# Patient Record
Sex: FEMALE | Race: WHITE | Employment: PART TIME | URBAN - METROPOLITAN AREA
[De-identification: names, ages, dates, MRNs, and addresses within clinical notes are randomized per-mention and may not be internally consistent; named-entity substitution may affect disease eponyms.]

---

## 2017-01-23 ENCOUNTER — OFFICE VISIT (OUTPATIENT)
Dept: ORTHOPEDIC SURGERY | Age: 52
End: 2017-01-23

## 2017-01-23 VITALS
SYSTOLIC BLOOD PRESSURE: 114 MMHG | WEIGHT: 175 LBS | HEART RATE: 60 BPM | HEIGHT: 69 IN | DIASTOLIC BLOOD PRESSURE: 80 MMHG | BODY MASS INDEX: 25.92 KG/M2

## 2017-01-23 DIAGNOSIS — M25.561 CHRONIC PAIN OF RIGHT KNEE: ICD-10-CM

## 2017-01-23 DIAGNOSIS — M17.11 PRIMARY OSTEOARTHRITIS OF RIGHT KNEE: Primary | ICD-10-CM

## 2017-01-23 DIAGNOSIS — M21.161 ACQUIRED VARUS DEFORMITY KNEE, RIGHT: ICD-10-CM

## 2017-01-23 DIAGNOSIS — G89.29 CHRONIC PAIN OF RIGHT KNEE: ICD-10-CM

## 2017-01-23 DIAGNOSIS — Z87.828 HX OF TEAR OF ACL (ANTERIOR CRUCIATE LIGAMENT): ICD-10-CM

## 2017-01-23 PROCEDURE — 99203 OFFICE O/P NEW LOW 30 MIN: CPT | Performed by: ORTHOPAEDIC SURGERY

## 2017-01-23 PROCEDURE — 73564 X-RAY EXAM KNEE 4 OR MORE: CPT | Performed by: ORTHOPAEDIC SURGERY

## 2017-01-23 RX ORDER — FLUTICASONE PROPIONATE 50 MCG
1 SPRAY, SUSPENSION (ML) NASAL DAILY
COMMUNITY

## 2017-01-23 RX ORDER — DICLOFENAC SODIUM 75 MG/1
75 TABLET, DELAYED RELEASE ORAL 2 TIMES DAILY
Qty: 60 TABLET | Refills: 3 | Status: SHIPPED | OUTPATIENT
Start: 2017-01-23 | End: 2019-04-03 | Stop reason: CLARIF

## 2017-01-30 ENCOUNTER — HOSPITAL ENCOUNTER (OUTPATIENT)
Dept: PHYSICAL THERAPY | Age: 52
Discharge: HOME | End: 2017-01-31
Admitting: ORTHOPAEDIC SURGERY

## 2017-02-20 ENCOUNTER — OFFICE VISIT (OUTPATIENT)
Dept: ORTHOPEDIC SURGERY | Age: 52
End: 2017-02-20

## 2017-02-20 VITALS
DIASTOLIC BLOOD PRESSURE: 79 MMHG | HEIGHT: 69 IN | BODY MASS INDEX: 25.92 KG/M2 | WEIGHT: 175 LBS | HEART RATE: 67 BPM | SYSTOLIC BLOOD PRESSURE: 116 MMHG

## 2017-02-20 DIAGNOSIS — Z87.828 HX OF TEAR OF ACL (ANTERIOR CRUCIATE LIGAMENT): ICD-10-CM

## 2017-02-20 DIAGNOSIS — M21.161 ACQUIRED VARUS DEFORMITY KNEE, RIGHT: ICD-10-CM

## 2017-02-20 DIAGNOSIS — M23.303 MENISCUS, MEDIAL, DERANGEMENT, RIGHT: ICD-10-CM

## 2017-02-20 DIAGNOSIS — M17.11 PRIMARY OSTEOARTHRITIS OF RIGHT KNEE: Primary | ICD-10-CM

## 2017-02-20 PROCEDURE — 99213 OFFICE O/P EST LOW 20 MIN: CPT | Performed by: ORTHOPAEDIC SURGERY

## 2017-02-20 PROCEDURE — 20610 DRAIN/INJ JOINT/BURSA W/O US: CPT | Performed by: ORTHOPAEDIC SURGERY

## 2017-03-24 ENCOUNTER — TELEPHONE (OUTPATIENT)
Dept: ORTHOPEDIC SURGERY | Age: 52
End: 2017-03-24

## 2017-04-04 ENCOUNTER — HOSPITAL ENCOUNTER (OUTPATIENT)
Dept: PREADMISSION TESTING | Age: 52
Discharge: HOME | End: 2017-04-04
Attending: ORTHOPAEDIC SURGERY | Admitting: ORTHOPAEDIC SURGERY

## 2017-04-04 VITALS
OXYGEN SATURATION: 99 % | DIASTOLIC BLOOD PRESSURE: 73 MMHG | HEIGHT: 69 IN | WEIGHT: 181 LBS | RESPIRATION RATE: 16 BRPM | BODY MASS INDEX: 26.81 KG/M2 | TEMPERATURE: 98 F | SYSTOLIC BLOOD PRESSURE: 125 MMHG | HEART RATE: 60 BPM

## 2017-04-04 ASSESSMENT — PAIN DESCRIPTION - DESCRIPTORS: DESCRIPTORS: ACHING

## 2017-04-04 ASSESSMENT — PAIN DESCRIPTION - PAIN TYPE: TYPE: CHRONIC PAIN

## 2017-04-04 ASSESSMENT — PAIN DESCRIPTION - ORIENTATION: ORIENTATION: RIGHT

## 2017-04-04 ASSESSMENT — PAIN SCALES - GENERAL: PAINLEVEL_OUTOF10: 3

## 2017-04-04 ASSESSMENT — PAIN DESCRIPTION - LOCATION: LOCATION: KNEE

## 2017-04-17 ENCOUNTER — OFFICE VISIT (OUTPATIENT)
Dept: ORTHOPEDIC SURGERY | Age: 52
End: 2017-04-17

## 2017-04-17 VITALS
HEART RATE: 62 BPM | BODY MASS INDEX: 26.81 KG/M2 | HEIGHT: 69 IN | DIASTOLIC BLOOD PRESSURE: 76 MMHG | SYSTOLIC BLOOD PRESSURE: 114 MMHG | WEIGHT: 181 LBS

## 2017-04-17 DIAGNOSIS — M17.11 PRIMARY OSTEOARTHRITIS OF RIGHT KNEE: ICD-10-CM

## 2017-04-17 DIAGNOSIS — Z96.652 STATUS POST TOTAL LEFT KNEE REPLACEMENT: Primary | ICD-10-CM

## 2017-04-17 DIAGNOSIS — M17.12 PRIMARY OSTEOARTHRITIS OF LEFT KNEE: Chronic | ICD-10-CM

## 2017-04-17 PROCEDURE — 99213 OFFICE O/P EST LOW 20 MIN: CPT | Performed by: ORTHOPAEDIC SURGERY

## 2017-04-17 RX ORDER — PANTOPRAZOLE SODIUM 40 MG/1
40 TABLET, DELAYED RELEASE ORAL DAILY
Qty: 30 TABLET | Refills: 3 | Status: SHIPPED | OUTPATIENT
Start: 2017-04-17 | End: 2019-04-03 | Stop reason: CLARIF

## 2017-05-01 ENCOUNTER — OFFICE VISIT (OUTPATIENT)
Dept: ORTHOPEDIC SURGERY | Age: 52
End: 2017-05-01

## 2017-05-01 VITALS
DIASTOLIC BLOOD PRESSURE: 70 MMHG | SYSTOLIC BLOOD PRESSURE: 114 MMHG | HEART RATE: 81 BPM | WEIGHT: 181 LBS | BODY MASS INDEX: 26.81 KG/M2 | HEIGHT: 69 IN

## 2017-05-01 DIAGNOSIS — Z96.651 STATUS POST TOTAL RIGHT KNEE REPLACEMENT: Primary | ICD-10-CM

## 2017-05-01 DIAGNOSIS — Z96.652 STATUS POST TOTAL LEFT KNEE REPLACEMENT: ICD-10-CM

## 2017-05-01 DIAGNOSIS — Z96.659 POSTOPERATIVE STIFFNESS OF TOTAL KNEE REPLACEMENT, SUBSEQUENT ENCOUNTER: ICD-10-CM

## 2017-05-01 DIAGNOSIS — T84.89XD POSTOPERATIVE STIFFNESS OF TOTAL KNEE REPLACEMENT, SUBSEQUENT ENCOUNTER: ICD-10-CM

## 2017-05-01 DIAGNOSIS — M25.669 POSTOPERATIVE STIFFNESS OF TOTAL KNEE REPLACEMENT, SUBSEQUENT ENCOUNTER: ICD-10-CM

## 2017-05-01 PROCEDURE — 99024 POSTOP FOLLOW-UP VISIT: CPT | Performed by: ORTHOPAEDIC SURGERY

## 2017-05-01 RX ORDER — HYDROCODONE BITARTRATE AND ACETAMINOPHEN 5; 325 MG/1; MG/1
1 TABLET ORAL 3 TIMES DAILY PRN
Qty: 60 TABLET | Refills: 0 | Status: SHIPPED | OUTPATIENT
Start: 2017-05-01 | End: 2017-05-22

## 2017-05-01 RX ORDER — OXYCODONE AND ACETAMINOPHEN 7.5; 325 MG/1; MG/1
2 TABLET ORAL EVERY 4 HOURS PRN
Qty: 40 TABLET | Refills: 0 | Status: SHIPPED | OUTPATIENT
Start: 2017-05-01 | End: 2017-05-22

## 2017-05-01 RX ORDER — ONDANSETRON 4 MG/1
4 TABLET, FILM COATED ORAL 4 TIMES DAILY PRN
Qty: 39 TABLET | Refills: 0 | Status: SHIPPED | OUTPATIENT
Start: 2017-05-01 | End: 2019-04-03 | Stop reason: CLARIF

## 2017-05-04 ENCOUNTER — HOSPITAL ENCOUNTER (OUTPATIENT)
Dept: PHYSICAL THERAPY | Age: 52
Discharge: HOME | End: 2017-05-31
Admitting: ORTHOPAEDIC SURGERY

## 2017-05-11 ENCOUNTER — HOSPITAL ENCOUNTER (OUTPATIENT)
Dept: PHYSICAL THERAPY | Age: 52
Discharge: HOME | End: 2017-05-11
Admitting: ORTHOPAEDIC SURGERY

## 2017-05-12 ENCOUNTER — HOSPITAL ENCOUNTER (OUTPATIENT)
Dept: PHYSICAL THERAPY | Age: 52
Discharge: HOME | End: 2017-05-12
Admitting: ORTHOPAEDIC SURGERY

## 2017-05-16 ENCOUNTER — HOSPITAL ENCOUNTER (OUTPATIENT)
Dept: PHYSICAL THERAPY | Age: 52
Discharge: HOME | End: 2017-05-16
Admitting: ORTHOPAEDIC SURGERY

## 2017-05-19 ENCOUNTER — HOSPITAL ENCOUNTER (OUTPATIENT)
Dept: PHYSICAL THERAPY | Age: 52
Discharge: HOME | End: 2017-05-19
Admitting: ORTHOPAEDIC SURGERY

## 2017-05-22 ENCOUNTER — OFFICE VISIT (OUTPATIENT)
Dept: ORTHOPEDIC SURGERY | Age: 52
End: 2017-05-22

## 2017-05-22 VITALS
SYSTOLIC BLOOD PRESSURE: 102 MMHG | HEIGHT: 69 IN | HEART RATE: 40 BPM | WEIGHT: 181 LBS | DIASTOLIC BLOOD PRESSURE: 66 MMHG | BODY MASS INDEX: 26.81 KG/M2

## 2017-05-22 DIAGNOSIS — Z96.659 POSTOPERATIVE STIFFNESS OF TOTAL KNEE REPLACEMENT, INITIAL ENCOUNTER (HCC): ICD-10-CM

## 2017-05-22 DIAGNOSIS — Z96.651 STATUS POST TOTAL RIGHT KNEE REPLACEMENT: ICD-10-CM

## 2017-05-22 DIAGNOSIS — M25.669 POSTOPERATIVE STIFFNESS OF TOTAL KNEE REPLACEMENT, INITIAL ENCOUNTER (HCC): ICD-10-CM

## 2017-05-22 DIAGNOSIS — Z96.652 STATUS POST TOTAL LEFT KNEE REPLACEMENT: Primary | ICD-10-CM

## 2017-05-22 DIAGNOSIS — T84.89XA POSTOPERATIVE STIFFNESS OF TOTAL KNEE REPLACEMENT, INITIAL ENCOUNTER (HCC): ICD-10-CM

## 2017-05-22 PROCEDURE — 99024 POSTOP FOLLOW-UP VISIT: CPT | Performed by: ORTHOPAEDIC SURGERY

## 2017-05-22 RX ORDER — PREDNISONE 20 MG/1
20 TABLET ORAL 3 TIMES DAILY
Qty: 35 TABLET | Refills: 0 | Status: SHIPPED | OUTPATIENT
Start: 2017-05-22 | End: 2017-06-23 | Stop reason: ALTCHOICE

## 2017-05-23 ENCOUNTER — HOSPITAL ENCOUNTER (OUTPATIENT)
Dept: PHYSICAL THERAPY | Age: 52
Discharge: HOME | End: 2017-05-23
Admitting: ORTHOPAEDIC SURGERY

## 2017-05-25 ENCOUNTER — HOSPITAL ENCOUNTER (OUTPATIENT)
Dept: PHYSICAL THERAPY | Age: 52
Discharge: HOME | End: 2017-05-25
Admitting: ORTHOPAEDIC SURGERY

## 2017-05-30 ENCOUNTER — HOSPITAL ENCOUNTER (OUTPATIENT)
Dept: PHYSICAL THERAPY | Age: 52
Discharge: HOME | End: 2017-05-30
Admitting: ORTHOPAEDIC SURGERY

## 2017-06-01 ENCOUNTER — HOSPITAL ENCOUNTER (OUTPATIENT)
Dept: PHYSICAL THERAPY | Age: 52
Discharge: HOME | End: 2017-06-01
Admitting: ORTHOPAEDIC SURGERY

## 2017-06-06 ENCOUNTER — HOSPITAL ENCOUNTER (OUTPATIENT)
Dept: PHYSICAL THERAPY | Age: 52
Discharge: HOME | End: 2017-06-06
Admitting: ORTHOPAEDIC SURGERY

## 2017-06-08 ENCOUNTER — HOSPITAL ENCOUNTER (OUTPATIENT)
Dept: PHYSICAL THERAPY | Age: 52
Discharge: HOME | End: 2017-06-08
Admitting: ORTHOPAEDIC SURGERY

## 2017-06-13 ENCOUNTER — HOSPITAL ENCOUNTER (OUTPATIENT)
Dept: PHYSICAL THERAPY | Age: 52
Discharge: HOME | End: 2017-06-13
Admitting: ORTHOPAEDIC SURGERY

## 2017-06-15 ENCOUNTER — HOSPITAL ENCOUNTER (OUTPATIENT)
Dept: PHYSICAL THERAPY | Age: 52
Discharge: HOME | End: 2017-06-15
Admitting: ORTHOPAEDIC SURGERY

## 2017-06-20 ENCOUNTER — HOSPITAL ENCOUNTER (OUTPATIENT)
Dept: PHYSICAL THERAPY | Age: 52
Discharge: HOME | End: 2017-06-20
Admitting: ORTHOPAEDIC SURGERY

## 2017-06-22 ENCOUNTER — HOSPITAL ENCOUNTER (OUTPATIENT)
Dept: PHYSICAL THERAPY | Age: 52
Discharge: HOME | End: 2017-06-22
Admitting: ORTHOPAEDIC SURGERY

## 2017-06-23 ENCOUNTER — OFFICE VISIT (OUTPATIENT)
Dept: ORTHOPEDIC SURGERY | Age: 52
End: 2017-06-23

## 2017-06-23 VITALS
BODY MASS INDEX: 26.81 KG/M2 | HEIGHT: 69 IN | HEART RATE: 73 BPM | SYSTOLIC BLOOD PRESSURE: 112 MMHG | DIASTOLIC BLOOD PRESSURE: 78 MMHG | WEIGHT: 181 LBS

## 2017-06-23 DIAGNOSIS — Z96.652 STATUS POST TOTAL LEFT KNEE REPLACEMENT: ICD-10-CM

## 2017-06-23 DIAGNOSIS — Z96.651 STATUS POST TOTAL RIGHT KNEE REPLACEMENT: Primary | ICD-10-CM

## 2017-06-23 PROCEDURE — 99024 POSTOP FOLLOW-UP VISIT: CPT | Performed by: ORTHOPAEDIC SURGERY

## 2017-06-23 PROCEDURE — 73562 X-RAY EXAM OF KNEE 3: CPT | Performed by: ORTHOPAEDIC SURGERY

## 2017-06-26 ENCOUNTER — HOSPITAL ENCOUNTER (OUTPATIENT)
Dept: PHYSICAL THERAPY | Age: 52
Discharge: HOME | End: 2017-06-26
Admitting: ORTHOPAEDIC SURGERY

## 2017-07-17 ENCOUNTER — HOSPITAL ENCOUNTER (OUTPATIENT)
Dept: PHYSICAL THERAPY | Age: 52
Discharge: HOME | End: 2017-07-17
Admitting: ORTHOPAEDIC SURGERY

## 2017-07-20 ENCOUNTER — HOSPITAL ENCOUNTER (OUTPATIENT)
Dept: PHYSICAL THERAPY | Age: 52
Discharge: HOME | End: 2017-07-20
Admitting: ORTHOPAEDIC SURGERY

## 2017-09-06 ENCOUNTER — OFFICE VISIT (OUTPATIENT)
Dept: ORTHOPEDIC SURGERY | Age: 52
End: 2017-09-06

## 2017-09-06 VITALS
HEIGHT: 69 IN | SYSTOLIC BLOOD PRESSURE: 134 MMHG | BODY MASS INDEX: 26.81 KG/M2 | WEIGHT: 181 LBS | DIASTOLIC BLOOD PRESSURE: 89 MMHG | HEART RATE: 72 BPM

## 2017-09-06 DIAGNOSIS — S43.004A SHOULDER DISLOCATION, RIGHT, INITIAL ENCOUNTER: Primary | ICD-10-CM

## 2017-09-06 DIAGNOSIS — M25.512 LEFT SHOULDER PAIN, UNSPECIFIED CHRONICITY: ICD-10-CM

## 2017-09-06 PROCEDURE — 73030 X-RAY EXAM OF SHOULDER: CPT | Performed by: ORTHOPAEDIC SURGERY

## 2017-09-06 PROCEDURE — 99214 OFFICE O/P EST MOD 30 MIN: CPT | Performed by: ORTHOPAEDIC SURGERY

## 2017-09-20 ENCOUNTER — OFFICE VISIT (OUTPATIENT)
Dept: ORTHOPEDIC SURGERY | Age: 52
End: 2017-09-20

## 2017-09-20 VITALS
HEART RATE: 74 BPM | SYSTOLIC BLOOD PRESSURE: 122 MMHG | HEIGHT: 69 IN | BODY MASS INDEX: 26.81 KG/M2 | DIASTOLIC BLOOD PRESSURE: 76 MMHG | WEIGHT: 181 LBS

## 2017-09-20 DIAGNOSIS — S43.005A SHOULDER DISLOCATION, LEFT, INITIAL ENCOUNTER: Primary | ICD-10-CM

## 2017-09-20 PROCEDURE — 99213 OFFICE O/P EST LOW 20 MIN: CPT | Performed by: ORTHOPAEDIC SURGERY

## 2017-10-04 ENCOUNTER — HOSPITAL ENCOUNTER (OUTPATIENT)
Dept: PHYSICAL THERAPY | Age: 52
Discharge: HOME | End: 2017-10-04
Admitting: ORTHOPAEDIC SURGERY

## 2017-10-04 NOTE — FLOWSHEET NOTE
[facility]  Orthopaedics and Sports Rehabilitation, Adel[facility]wick       Physical Therapy Daily Treatment Note  Date:  10/4/2017    Patient Name:  Brayan Ritchie    :  1965  MRN: [id number]  Restrictions/Precautions:    Medical/Treatment Diagnosis Information:  Diagnosis: S43.005A (ICD-10-CM) - Shoulder dislocation, left, initial encounter  Treatment Diagnosis: Decreased functional mobility ; Decreased ADL status; Decreased ROM; Decreased strength; d/t shoulder dislocation with pain into overhead motion  Insurance/Certification information:  PT Insurance Information: PT BENEFITS  FACILITY/ CUSTOM DESIGN BENEFTIS/ EFFECTIVE 13/ ACTIVE/ DED 0/ OOP 6600/ COPAY 25/ PAYS 100%/ 60 VPCY COMB/ USED 18 VISITS/0 VAISHNAVI/ JOHN/ REF# [id number]/ 10-3-17 PAG  Physician Information:  Referring Practitioner: Cecilia Holbrook of care signed (Y/N): Y    Date of Patient follow up with Physician: 6 weeks    G-Code (if applicable):      Date G-Code Applied:  10/4/17  PT G-Codes  Functional Assessment Tool Used: UEFI  Score: 77/80 or 4% deficit  Functional Limitation: Carrying, moving and handling objects  Carrying, Moving and Handling Objects Current Status (): At least 1 percent but less than 20 percent impaired, limited or restricted  Carrying, Moving and Handling Objects Goal Status (): At least 1 percent but less than 20 percent impaired, limited or restricted    Progress Note: [x]  Yes  []  No  Next due by: Visit #10 or 17      Latex Allergy:  [x]NO      []YES  Preferred Language for Healthcare:   [x]English       []other:19    Visit # Insurance Allowable   19 60     Pain level:  0/10     SUBJECTIVE:  See eval    OBJECTIVE:     UEFI 77/80 or 4% deficit    ROM PROM AROM  Comment     L R L R     Flexion     115! 165 anxiety   Abduction     NA!  165 anxiety   ER     32! 83 at side d/t anxiety   IR     to belly 40 anxiety   Other(cervical)             Other                    Strength L R Comment   Flexion 4+ 4+     Abduction 4+ 4+     ER 4+ 4+     IR 4+ 4+        Special Tests Results/Comment   Load/Shift  Positive   Sulcus Sign Positive   Apprehension Sign Positive      Reflexes/Sensation:                         [x]Dermatomes/Myotomes intact                         [x]Reflexes equal and normal bilaterally                        []Other:     Joint mobility: 4/9 Beighton's Index                        []Normal                                   []Hypo                        [x]Hyper     Palpation: no ttp                        Functional Mobility/Transfers: independence     Posture: normal    RESTRICTIONS/PRECAUTIONS: avoid compromising positions    Exercises/Interventions:   Exercises:  Exercise/Equipment Resistance/Repetitions Other comments   Stretching/PROM     Wand     Table Slides 20x    UE [place]     Pulleys     Pendulum          Isometrics     Retraction 10x10\"         Weight shift     Flexion     Abduction     External Rotation Green, 10x10\"    Internal Rotation     Biceps     Triceps          PRE's     Flexion     Abduction     External Rotation     Internal Rotation     Shrugs     EXT     Reverse Flys     Serratus     Horizontal Abd with ER     Biceps     Triceps     Retraction          Cable Column/Theraband     External Rotation     Internal Rotation     Shrugs     Lats     Ext     Flex     Scapular Retraction     BIC     TRIC     PNF          Dynamic Stability     BoW 2x30 towel   Pushup + x30 wall        Plyoback          Manual interventions                     Therapeutic Exercise and NMR EXR  [x] (75522) Provided verbal/tactile cueing for activities related to strengthening, flexibility, endurance, ROM  for improvements in scapular, scapulothoracic and UE control with self care, reaching, carrying, lifting, house/yardwork, driving/computer work.    [] (69328) Provided verbal/tactile cueing for activities related to improving balance, coordination, kinesthetic sense, posture, motor skill, proprioception  to assist with  scapular, scapulothoracic and UE control with self care, reaching, carrying, lifting, house/yardwork, driving/computer work. Therapeutic Activities:    [] (88578 or 42125) Provided verbal/tactile cueing for activities related to improving balance, coordination, kinesthetic sense, posture, motor skill, proprioception and motor activation to allow for proper function of scapular, scapulothoracic and UE control with self care, carrying, lifting, driving/computer work.      Home Exercise Program:    [x] (79785) Reviewed/Progressed HEP activities related to strengthening, flexibility, endurance, ROM of scapular, scapulothoracic and UE control with self care, reaching, carrying, lifting, house/yardwork, driving/computer work  [] (15673) Reviewed/Progressed HEP activities related to improving balance, coordination, kinesthetic sense, posture, motor skill, proprioception of scapular, scapulothoracic and UE control with self care, reaching, carrying, lifting, house/yardwork, driving/computer work      Manual Treatments:  PROM / STM / Oscillations-Mobs:  G-I, II, III, IV (PA's, Inf., Post.)  [] (99909) Provided manual therapy to mobilize soft tissue/joints of cervical/CT, scapular GHJ and UE for the purpose of modulating pain, promoting relaxation,  increasing ROM, reducing/eliminating soft tissue swelling/inflammation/restriction, improving soft tissue extensibility and allowing for proper ROM for normal function with self care, reaching, carrying, lifting, house/yardwork, driving/computer work    Modalities: declined     Charges:  Timed Code Treatment Minutes: 30   Total Treatment Minutes: 50       [x] EVAL (LOW) 54218 (typically 20 minutes face-to-face)  [] EVAL (MOD) 47354 (typically 30 minutes face-to-face)  [] EVAL (HIGH) 00726 (typically 45 minutes face-to-face)  [] RE-EVAL      [x] PD(65721) x  1   [] IONTO  [x] NMR (02274) x  1   [] VASO  [] Manual (55303) x       [] Other:  [] TA x Discharge    Electronically signed by: Janette Mckay PT, DPT      Go Williamson. Grover Johnson, [place]RA[place], [id number]  Physical Therapist  [email or web address]

## 2017-10-04 NOTE — PLAN OF CARE
continue independently. Relevant Medical History: Recent therapy with discharge for dislocation  Functional Disability Index:PT G-Codes  Functional Assessment Tool Used: UEFI  Score: 77/80 or 4% deficit  Functional Limitation: Carrying, moving and handling objects  Carrying, Moving and Handling Objects Current Status (): At least 1 percent but less than 20 percent impaired, limited or restricted  Carrying, Moving and Handling Objects Goal Status (): At least 1 percent but less than 20 percent impaired, limited or restricted    Pain Scale: 0/10  Easing factors: rest  Provocative factors: weighted overhead    Type: []Constant   [x]Intermittent  []Radiating [x]Localized []other:     Numbness/Tingling: N/A    Occupation/School: Nurse at [employer] Level of Function: Independent with ADLs and IADLs, work, and swimming    OBJECTIVE:     ROM PROM AROM  Comment    L R L R    Flexion   115! 165 anxiety   Abduction   NA! 165 anxiety   ER   32! 83 at side d/t anxiety   IR   to belly 40 anxiety   Other(cervical)        Other             Strength L R Comment   Flexion 4+ 4+    Abduction 4+ 4+    ER 4+ 4+    IR 4+ 4+      Special Tests Results/Comment   Load/Shift  Positive   Sulcus Sign Positive   Apprehension Sign Positive     Reflexes/Sensation:    [x]Dermatomes/Myotomes intact    [x]Reflexes equal and normal bilaterally   []Other:    Joint mobility: 4/9 Beighton's Index   []Normal    []Hypo   [x]Hyper    Palpation: no ttp     Functional Mobility/Transfers: independence    Posture: normal                       [x] Patient history, allergies, meds reviewed. Medical chart reviewed. See intake form. Review Of Systems (ROS):  [x]Performed Review of systems (Integumentary, CardioPulmonary, Neurological) by intake and observation. Intake form has been scanned into medical record.  Patient has been instructed to contact their primary care physician regarding ROS issues if not already being addressed at this time. Co-morbidities/Complexities (which will affect course of rehabilitation):   []None           Arthritic conditions   []Rheumatoid arthritis (M05.9)  []Osteoarthritis (M19.91)   Cardiovascular conditions   []Hypertension (I10)  []Hyperlipidemia (E78.5)  []Angina pectoris (I20)  []Atherosclerosis (I70)   Musculoskeletal conditions   []Disc pathology   []Congenital spine pathologies   [x]Prior surgical intervention  []Osteoporosis (M81.8)  []Osteopenia (M85.8)   Endocrine conditions   []Hypothyroid (E03.9)  []Hyperthyroid Gastrointestinal conditions   []Constipation (B89.38)   Metabolic conditions   []Morbid obesity (E66.01)  []Diabetes type 1(E10.65) or 2 (E11.65)   []Neuropathy (G60.9)     Pulmonary conditions   []Asthma (J45)  []Coughing   []COPD (J44.9)   Psychological Disorders  []Anxiety (F41.9)  []Depression (F32.9)   []Other:   []Other:          Barriers to/and or personal factors that will affect rehab potential:              []Age  []Sex              []Motivation/Lack of Motivation                        []Co-Morbidities              [x]Cognitive Function, education/learning barriers              []Environmental, home barriers              []profession/work barriers  [x]past PT/medical experience  []other:  Justification:     Falls Risk Assessment (30 days):   [x] Falls Risk assessed and no intervention required. [] Falls Risk assessed and Patient requires intervention due to being higher risk   TUG score (>12s at risk):     [] Falls education provided, including       G-Codes:  PT G-Codes  Functional Assessment Tool Used: UEFI  Score: 77/80 or 4% deficit  Functional Limitation: Carrying, moving and handling objects  Carrying, Moving and Handling Objects Current Status (): At least 1 percent but less than 20 percent impaired, limited or restricted  Carrying, Moving and Handling Objects Goal Status ():  At least 1 percent but less than 20 percent impaired, limited or restricted    ASSESSMENT:   Functional Impairments   []Noted spinal or UE joint hypomobility   [x]Noted spinal or UE joint hypermobility   [x]Decreased UE functional ROM   [x]Decreased UE functional strength   []Abnormal reflexes/sensation/myotomal/dermatomal deficits   [x]Decreased RC/scapular/core strength and neuromuscular control   []other:      Functional Activity Limitations (from functional questionnaire and intake)   []Reduced ability to tolerate prolonged functional positions   [x]Reduced ability or difficulty with changes of positions or transfers between positions   []Reduced ability to maintain good posture and demonstrate good body mechanics with sitting, bending, and lifting   [x] Reduced ability or tolerance with driving and/or computer work   []Reduced ability to sleep   [x]Reduced ability to perform lifting, reaching, carrying tasks   []Reduced ability to tolerate impact through UE   [x]Reduced ability to reach behind back   [x]Reduced ability to  or hold objects   [x]Reduced ability to throw or toss an object   []other:      Participation Restrictions   [x]Reduced participation in self care activities   [x]Reduced participation in home management activities   [x]Reduced participation in work activities   []Reduced participation in social activities. [x]Reduced participation in sport / recreational activities. Classification:   []Signs/symptoms consistent with post-surgical status including decreased ROM, strength and function.   []Signs/symptoms consistent with joint sprain/strain   []Signs/symptoms consistent with shoulder impingement   []Signs/symptoms consistent with shoulder/elbow/wrist tendinopathy   []Signs/symptoms consistent with Rotator cuff tear   []Signs/symptoms consistent with labral tear   []Signs/symptoms consistent with postural dysfunction    []Signs/symptoms consistent with Glenohumeral IR Deficit - <45 degrees   []Signs/symptoms consistent with facet dysfunction of cervical/thoracic spine    []Signs/symptoms consistent with pathology which may benefit from Dry needling     [x]other: Signs/Symptoms consistent with Shoulder Dislocation    Prognosis/Rehab Potential:      []Excellent   [x]Good    []Fair   []Poor    Tolerance of evaluation/treatment:    []Excellent   [x]Good    []Fair   []Poor    Physical Therapy Evaluation Complexity Justification  [x] A history of present problem with:  [] no personal factors and/or comorbidities that impact the plan of care;  [x]1-2 personal factors and/or comorbidities that impact the plan of care  []3 personal factors and/or comorbidities that impact the plan of care  [x] An examination of body systems using standardized tests and measures addressing any of the following: body structures and functions (impairments), activity limitations, and/or participation restrictions;:  [x] a total of 1-2 or more elements   [] a total of 3 or more elements   [] a total of 4 or more elements   [x] A clinical presentation with:  [x] stable and/or uncomplicated characteristics   [] evolving clinical presentation with changing characteristics  [] unstable and unpredictable characteristics;   [x] Clinical decision making of [x] low, [] moderate, [] high complexity using standardized patient assessment instrument and/or measurable assessment of functional outcome. [x] EVAL (LOW) 91512 (typically 20 minutes face-to-face)  [] EVAL (MOD) 44508 (typically 30 minutes face-to-face)  [] EVAL (HIGH) 08048 (typically 45 minutes face-to-face)  [] RE-EVAL       PLAN:  Frequency/Duration:  2 days per week for 8 Weeks:  INTERVENTIONS:  [x] Therapeutic exercise including: strength training, ROM, for Upper extremity and core   [x]  NMR activation and proprioception for UE, scap and Core   [x] Manual therapy as indicated for shoulder, scapula and spine to include: Dry Needling/IASTM, STM, PROM, Gr I-IV mobilizations, manipulation.    [x] Modalities as needed that may include:

## 2017-10-11 ENCOUNTER — HOSPITAL ENCOUNTER (OUTPATIENT)
Dept: MAMMOGRAPHY | Age: 52
Discharge: HOME | End: 2017-10-11
Attending: FAMILY MEDICINE | Admitting: FAMILY MEDICINE

## 2017-10-11 DIAGNOSIS — Z12.31 VISIT FOR SCREENING MAMMOGRAM: ICD-10-CM

## 2017-10-18 ENCOUNTER — HOSPITAL ENCOUNTER (OUTPATIENT)
Dept: PHYSICAL THERAPY | Age: 52
Discharge: HOME | End: 2017-10-18
Admitting: ORTHOPAEDIC SURGERY

## 2017-10-18 NOTE — FLOWSHEET NOTE
(92910)  [] ES(attended) (84975)      [] ES (un) (84863):     GOALS:  Patient stated goal: return to raising arms overhead without fear of dislocation     Therapist goals for Patient:   Short Term Goals: To be achieved in: 2 weeks  1. Independent in HEP and progression per patient tolerance, in order to prevent re-injury. 2. Patient will have a decrease in pain to facilitate improvement in movement, function, and ADLs as indicated by Functional Deficits.     Long Term Goals: To be achieved in: 8 weeks  1. Disability index score of 2% or less for the UEFS to assist with reaching prior level of function. 2. Patient will demonstrate increased AROM  to allow for proper joint functioning as indicated by patients Functional Deficits. 3. Patient will demonstrate an increase in Strength to good scapular and core control  for UE to allow for proper functional mobility as indicated by patients Functional Deficits. 4. Patient will return to functional activities without increased symptoms or restriction. Progression Towards Functional goals:  [] Patient is progressing as expected towards functional goals listed. [] Progression is slowed due to complexities listed. [] Progression has been slowed due to co-morbidities. [x] Plan just implemented, too soon to assess goals progression  [] Other:     ASSESSMENT:  Pt tolerated treatment well without c/o pain or soreness. Noted excellent carryover, demonstrating functional PROM and improving AROM with only slightly limited ER due to anxiety of position and not soft tissue limitation. Strength is also improved, with PT able to increase all exercises with added strengthening focusing on GH and Scapula stabilization. Pt required minimal verbal and manual cueing to facilitate SBS during TB retraction and arm position during S/L ER. PT updated HEP and reviewed with pt in regard to progression and implementation; pt verbalized understanding with all questions answered.

## 2017-10-25 ENCOUNTER — HOSPITAL ENCOUNTER (OUTPATIENT)
Dept: PHYSICAL THERAPY | Age: 52
Discharge: HOME | End: 2017-10-25
Admitting: ORTHOPAEDIC SURGERY

## 2017-10-25 NOTE — FLOWSHEET NOTE
[facility]  Orthopaedics and Sports Rehabilitation, [facility]       Physical Therapy Daily Treatment Note  Date:  10/25/2017    Patient Name:  Isidro Luna    :  1965  MRN: [id number]  Restrictions/Precautions:    Medical/Treatment Diagnosis Information:  Diagnosis: S43.005A (ICD-10-CM) - Shoulder dislocation, left, initial encounter  Treatment Diagnosis: Decreased functional mobility ; Decreased ADL status; Decreased ROM; Decreased strength; d/t shoulder dislocation with pain into overhead motion  Insurance/Certification information:  PT Insurance Information: PT BENEFITS  FACILITY/ CUSTOM DESIGN BENEFTIS/ EFFECTIVE 13/ ACTIVE/ DED 0/ OOP 6600/ COPAY 25/ PAYS 100%/ 60 VPCY COMB/ USED 18 VISITS/0 VAISHNAVI/ JOHN/ REF# [id number]/ 10-3-17 PAG  Physician Information:  Referring Practitioner: Ran Hayes of care signed (Y/N): Y    Date of Patient follow up with Physician: 6 weeks    G-Code (if applicable):      Date G-Code Applied:  10/25/17  PT G-Codes  Functional Assessment Tool Used: UEFI  Score: 77/80 or 4% deficit  Functional Limitation: Carrying, moving and handling objects  Carrying, Moving and Handling Objects Current Status (): At least 1 percent but less than 20 percent impaired, limited or restricted  Carrying, Moving and Handling Objects Goal Status (): At least 1 percent but less than 20 percent impaired, limited or restricted  Carrying, Moving and Handling Objects Discharge Status (): At least 1 percent but less than 20 percent impaired, limited or restricted    Progress Note: [x]  Yes  []  No  Next due by: Visit #10 or 17      Latex Allergy:  [x]NO      []YES  Preferred Language for Healthcare:   [x]English       []other:19    Visit # Insurance Allowable   21 60     Pain level:  0/10     SUBJECTIVE:  Denies any pain in her shoulder and overall is doing exceedingly well with motion restored without anxiety in reaching overhead and behind.    Reports compliance with HEP without any c/o pain or discomfort. Denies any pain with ADLs. Reports she is back to performing all ADLs without issue with only not swimming as her biggest deficit. Overall, feels she is 80%+ improved since starting therapy with the last 20% being more athletic activities that she is currently unable to perform.         OBJECTIVE:     UEFI 77/80 or 4% deficit            ROM PROM AROM  Comment     L R L R     Flexion     165 165    Abduction     165 165    ER     90 90    IR     40 40    Other(cervical)             Other                    Strength L R Comment   Flexion 4+ 4+     Abduction 4+ 4+     ER 4+ 4+     IR 4+ 4+        Special Tests Results/Comment   Load/Shift  Positive   Sulcus Sign Positive   Apprehension Sign Neg      Reflexes/Sensation:                         [x]Dermatomes/Myotomes intact                         [x]Reflexes equal and normal bilaterally                        []Other:     Joint mobility: 4/9 Beighton's Index                        []Normal                                   []Hypo                        [x]Hyper     Palpation: no ttp                        Functional Mobility/Transfers: independence     Posture: normal    RESTRICTIONS/PRECAUTIONS: avoid compromising positions    Exercises/Interventions:   Exercises:  Exercise/Equipment Resistance/Repetitions Other comments   Stretching/PROM     Wand     Table Slides 20x Flex/ABd   Wall Walk 10x flex   UE [place]     Pulleys     Cw/CCw        Isometrics             Weight shift     Flexion     Abduction     External Rotation Black, 10x10\"    Internal Rotation     Biceps     Triceps          PRE's     Flexion     Abduction     External Rotation 5#, x30    Internal Rotation     Shrugs     EXT     Reverse Flys     Serratus     Horizontal Abd with ER     Biceps     Triceps     Retraction          Cable Column/Theraband     External Rotation     Internal Rotation     Shrugs     Lats     Ext Blue, x30    Flex     Scapular Retraction Black, x30    BIC     TRIC     PNF          Dynamic Stability     BoW 2#, 3x30\" towel   Pushup + x45 at counter        Plyoback          Manual interventions                     Therapeutic Exercise and NMR EXR  [x] (81974) Provided verbal/tactile cueing for activities related to strengthening, flexibility, endurance, ROM  for improvements in scapular, scapulothoracic and UE control with self care, reaching, carrying, lifting, house/yardwork, driving/computer work. [x] (62770) Provided verbal/tactile cueing for activities related to improving balance, coordination, kinesthetic sense, posture, motor skill, proprioception  to assist with  scapular, scapulothoracic and UE control with self care, reaching, carrying, lifting, house/yardwork, driving/computer work. Therapeutic Activities:    [x] (33501 or 73291) Provided verbal/tactile cueing for activities related to improving balance, coordination, kinesthetic sense, posture, motor skill, proprioception and motor activation to allow for proper function of scapular, scapulothoracic and UE control with self care, carrying, lifting, driving/computer work.      Home Exercise Program:    [x] (94558) Reviewed/Progressed HEP activities related to strengthening, flexibility, endurance, ROM of scapular, scapulothoracic and UE control with self care, reaching, carrying, lifting, house/yardwork, driving/computer work  [] (14235) Reviewed/Progressed HEP activities related to improving balance, coordination, kinesthetic sense, posture, motor skill, proprioception of scapular, scapulothoracic and UE control with self care, reaching, carrying, lifting, house/yardwork, driving/computer work      Manual Treatments:  PROM / STM / Oscillations-Mobs:  G-I, II, III, IV (PA's, Inf., Post.)  [] (73279) Provided manual therapy to mobilize soft tissue/joints of cervical/CT, scapular GHJ and UE for the purpose of modulating pain, promoting relaxation,  increasing ROM, reducing/eliminating to assess goals progression  [] Other:     ASSESSMENT:  Pt tolerated treatment well without c/o pain or soreness. Noted excellent carryover, demonstrating functional PROM and improving AROM without compensation or anxiety. Demonstrates excellent understanding of HEP and progressions and has met all goals in POC outside UEFI score. However, is limited by frequent dislocations with questions involved in higher level functioning and is more than likely never going to return to throwing with her non-dominate side. Skilled physical therapy is no longer indicated and pt is now discharged to [facility] and is to continue independently to prevent further dislocations. PT updated HEP and reviewed with pt in regard to progression and implementation; pt verbalized understanding with all questions answered. Pt to call PT with any questions or concerns. Treatment/Activity Tolerance:  [x] Patient tolerated treatment well [] Patient limited by fatique  [] Patient limited by pain  [] Patient limited by other medical complications  [] Other:      Patient education: PT updated HEP and reviewed with pt in regard to progression and implementation; pt verbalized understanding with all questions answered. Prognosis: [x] Good [] Fair  [] Poor    Patient Requires Follow-up: [] Yes  [x] No    PLAN: See eval  [] Continue per plan of care [] Alter current plan (see comments)  [] Plan of care initiated [] Hold pending MD visit [x] Discharge    Electronically signed by: Karla Breen PT, DPT      BRAIN FinkT, [id number]  Physical Therapist  [email or web address]

## 2017-11-01 ENCOUNTER — HOSPITAL ENCOUNTER (OUTPATIENT)
Dept: PHYSICAL THERAPY | Age: 52
Discharge: HOME | End: 2017-11-30
Attending: ORTHOPAEDIC SURGERY | Admitting: ORTHOPAEDIC SURGERY

## 2019-04-03 ENCOUNTER — OFFICE VISIT (OUTPATIENT)
Dept: ORTHOPEDIC SURGERY | Age: 54
End: 2019-04-03
Payer: COMMERCIAL

## 2019-04-03 VITALS
HEIGHT: 69 IN | WEIGHT: 181 LBS | HEART RATE: 82 BPM | SYSTOLIC BLOOD PRESSURE: 123 MMHG | DIASTOLIC BLOOD PRESSURE: 81 MMHG | BODY MASS INDEX: 26.81 KG/M2

## 2019-04-03 DIAGNOSIS — M25.511 RIGHT SHOULDER PAIN, UNSPECIFIED CHRONICITY: ICD-10-CM

## 2019-04-03 DIAGNOSIS — M67.911 ROTATOR CUFF DYSFUNCTION, RIGHT: ICD-10-CM

## 2019-04-03 DIAGNOSIS — M19.011 OSTEOARTHRITIS OF GLENOHUMERAL JOINT, RIGHT: Primary | ICD-10-CM

## 2019-04-03 PROCEDURE — 1036F TOBACCO NON-USER: CPT | Performed by: ORTHOPAEDIC SURGERY

## 2019-04-03 PROCEDURE — 99214 OFFICE O/P EST MOD 30 MIN: CPT | Performed by: ORTHOPAEDIC SURGERY

## 2019-04-03 PROCEDURE — G8419 CALC BMI OUT NRM PARAM NOF/U: HCPCS | Performed by: ORTHOPAEDIC SURGERY

## 2019-04-03 PROCEDURE — 3017F COLORECTAL CA SCREEN DOC REV: CPT | Performed by: ORTHOPAEDIC SURGERY

## 2019-04-03 PROCEDURE — G8428 CUR MEDS NOT DOCUMENT: HCPCS | Performed by: ORTHOPAEDIC SURGERY

## 2019-04-03 RX ORDER — MONTELUKAST SODIUM 10 MG/1
10 TABLET ORAL NIGHTLY
COMMUNITY

## 2019-04-03 RX ORDER — MELOXICAM 15 MG/1
15 TABLET ORAL DAILY
Qty: 30 TABLET | Refills: 3 | Status: CANCELLED | OUTPATIENT
Start: 2019-04-03 | End: 2019-05-03

## 2019-04-03 ASSESSMENT — ENCOUNTER SYMPTOMS
ALLERGIC/IMMUNOLOGIC NEGATIVE: 1
GASTROINTESTINAL NEGATIVE: 1
RESPIRATORY NEGATIVE: 1
EYES NEGATIVE: 1

## 2019-04-03 NOTE — PROGRESS NOTES
[facility]  History and Physical  Shoulder Pain    Date:  4/3/2019    Name:  Marcy Valadez  Address:  [address] [place] [address]    :  1965      Age:   52 y.o.    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 Patient had a gradual onset of right shoulder pain 4 weeks ago without any specific triggers. She initially did notice having limited range of motion and acute pain. She began doing shoulder stretches and began taking Advil and diclofenac for anti-inflammatory agents. Patient has been able to maintain her range of motion however she continues to have persistent symptoms of pain. She denies any specific injuries to her shoulder. The patient has a history of having a shoulder dislocation for which she underwent shoulder stabilization in  with Dr. Lawanda Riedel and has not had any instability episodes since then. Pain Assessment  Location of Pain: Shoulder  Location Modifiers: Right  Severity of Pain: 1  Quality of Pain: Aching  Duration of Pain: Persistent  Frequency of Pain: Intermittent  Aggravating Factors: Bending, Stretching, Straightening  Relieving Factors: Rest, Ice  Result of Injury: No  Work-Related Injury: No  Are there other pain locations you wish to document?: No    Review of Systems:  A 14 point review of systems available in the scanned medical record as documented by the patient. The review is negative with the exception of those things mentioned in the History of Present Illness and Past Medical History.       Past History:  Past Medical History:   Diagnosis Date    Arthritis     PONV (postoperative nausea and vomiting)      Past Surgical History:   Procedure Laterality Date    ANTERIOR CRUCIATE LIGAMENT REPAIR Bilateral     Not on file     Emotionally abused: Not on file     Physically abused: Not on file     Forced sexual activity: Not on file   Other Topics Concern    Not on file   Social History Narrative    Not on file     No family history on file. Current Medications:    Current Outpatient Medications   Medication Sig Dispense Refill    montelukast (SINGULAIR) 10 MG tablet Take 10 mg by mouth nightly      fluticasone (FLONASE) 50 MCG/ACT nasal spray 1 spray by Nasal route daily      Multiple Vitamin (MULTI VITAMIN DAILY) TABS Take by mouth daily       cetirizine (ZYRTEC) 10 MG tablet Take 10 mg by mouth daily. No current facility-administered medications for this visit. Allergies:  No Known Allergies    Physical Exam:  Vitals:    04/03/19 1215   BP: 123/81   Pulse: 82     General: Delores Tao is a healthy and well appearing 52 y.o. female who is sitting comfortably in our office in acute distress. Alert and oriented. General Exam:   Constitutional: Patient is adequately groomed with no evidence of malnutrition  DTRs: Deep tendon reflexes are intact  Mental Status: The patient is oriented to time, place and person. The patient's mood and affect are appropriate. Lymphatic: The lymphatic examination bilaterally reveals all areas to be without enlargement or induration. Vascular: Examination reveals no swelling or calf tenderness. Peripheral pulses are palpable and 2+. Neurological: The patient has good coordination. There is no weakness or sensory deficit.    right Shoulder Exam:  Inspection:  No gross deformities, no signs of infection. Palpation:  She has tenderness over the rotator cuff footprint and mildly over the bicipital groove. She has no tenderness over the a.c. joint or posterior joint line. Active Range of Motion: Forward elevation 150°, abduction 150°, external rotation of 25° and internal rotation to the back is at T11 versus T9 on the contralateral side.     Passive Range of Motion:  Similar to active     Strength:  +4/5 supraspinatus, 4/5 external tissue with resistance    Special Tests:  Positive speeds, negative Catrina's. No David muscle deformity. Neurovascular: Sensation to light touch is intact, no motor deficits, palpable radial pulses 2+    Comparison left Shoulder Examination:    Inspection:  No gross deformities, no signs of infection. Palpation:  She has no tenderness over the rotator cuff footprint. She has no tenderness over the a.c. joint or posterior joint line. Active Range of Motion: Forward elevation 150°, abduction 150°, external rotation of 45° and internal rotation to the back is at T6. Passive Range of Motion:  Similar to active     Strength:  5/5 supraspinatus    Special Tests:  negative speeds, negative Catrina's. No David muscle deformity. Neurovascular: Sensation to light touch is intact, no motor deficits, palpable radial pulses 2+    Neuro: alert. oriented  Eyes: Extra-ocular muscles intact  Mouth: Oral mucosa moist. No perioral lesions  Pulm: Respirations unlabored and regular. Skin: warm, well perfused    Laboratory:  No visits with results within 14 Day(s) from this visit. Latest known visit with results is:   Admission on 04/25/2017, Discharged on 04/26/2017   Component Date Value    ABO/Rh 04/04/2017 B POS     Antibody Screen 04/04/2017 NEG     MRSA SCREEN RT-PCR 04/04/2017                      Value:Negative - MRSA DNA not detected.   Normal Range: Not detected      WBC 04/04/2017 4.0     RBC 04/04/2017 4.69     Hemoglobin 04/04/2017 13.2     Hematocrit 04/04/2017 40.2     MCV 04/04/2017 85.8     MCH 04/04/2017 28.1     MCHC 04/04/2017 32.8     RDW 04/04/2017 14.5     Platelets 18/69/9911 238     MPV 04/04/2017 9.3     Neutrophils % 04/04/2017 56.9     Lymphocytes % 04/04/2017 30.9     Monocytes % 04/04/2017 8.4     Eosinophils % 04/04/2017 2.0     Basophils % 04/04/2017 1.8     Neutrophils # 04/04/2017 2.3     hour(s)). Radiographic:  3 xray views of the right  shoulder including True AP in internal and external and axillary lateral were taken in our office today reveal no fractures, dislocations, visible tumors, or signs of acute trauma. She has a moderate size bone spur on the inferior humeral head with narrowing of the glenohumeral joint    Self assessment questionnaires including ASES and Simple Shoulder Test were completed today. Assessment:  Amado Gross is a 52y.o. year old female with right shoulder pain related to the glenohumeral osteoarthritis. Impression:  Encounter Diagnoses   Name Primary?  Right shoulder pain, unspecified chronicity     Rotator cuff dysfunction, right     Osteoarthritis of glenohumeral joint, right Yes       Office Procedures:  Orders Placed This Encounter   Procedures    XR SHOULDER RIGHT (MIN 2 VIEWS)     Standing Status:   Future     Number of Occurrences:   1     Standing Expiration Date:   4/3/2020       Plan:   The nature and natural history of osteoarthritis was discussed in detail the patient today. Treatment options both surgical and nonsurgical were discussed in detail. Patient was counseled with regard to the importance of activity modification discussed that at some point in her future she may require a shoulder replacement. That may be years before she would need that. However with this particular flareup of her shoulder we feel may have happened was that she had a slight biceps injury which may have triggered a cascade of events to cause inflammation in the shoulder as well as stiffness. The patient began doing stretches medially and took her anti-inflammatories which should help her to maintain her range of motion but we feel that given her exam that she may have some underlying rotator cuff dysfunction. At this time we would like to obtain an MRI of her right shoulder to evaluate the rotator cuff. She was agreeable to this.   We will also prescribe meloxicam 15 mg that she can take once daily. She was encouraged to ice her shoulders also. Once the MRI is completed we will see have her return back to our office for reevaluation. The patient was advised that NSAID-type medications have several potential side effects that include: gastrointestinal irritation including hemorrhage, renal injury, as well as an increased risk for heart attack and stroke. The patient was asked to take the medication with food and to stop if there is any symptoms of GI upset, including heartburn, nausea, increased gas or diarrhea. I asked the patient to contact their medical provider for vomiting, abdominal pain or black/bloody stools. The patient should have renal function testing per his medical provider periodically if the medication is taken on a regular basis. The patient should be alert for any swelling in the lower extremities and should stop taking the medication immediately and contact their medical provider should this occur. In addition, the patient should stop taking the medication immediately and contact their medical provider should there be any shortness of breath, fatigue and be evaluated in an emergency facility for any chest pain. The patient expresses understanding of these issues and questions were answered. Greater than 25 minutes was spent discussing her diagnosis and treatment options today. 12:33 PM      Lanie Porras PA-C  Orthopaedic Sports Medicine Physician Assistant    During this examination, I, Lanie Porras PA-C, functioned as a scribe for Dr. Gabe Vallejo. This dictation was performed with a verbal recognition program (DRAGON) and it was checked for errors. It is possible that there are still dictated errors within this office note. If so, please bring any errors to my attention for an addendum.   All efforts were made to ensure that this office note is accurate.  ____________________  I, Dr. Gabe Vallejo, personally performed the

## 2019-04-03 NOTE — PROGRESS NOTES
Review of Systems   Constitutional: Negative. HENT: Negative. Eyes: Negative. Respiratory: Negative. Cardiovascular: Negative. Gastrointestinal: Negative. Endocrine: Negative. Genitourinary: Negative. Musculoskeletal: Negative. Skin: Negative. Allergic/Immunologic: Negative. Neurological: Negative. Hematological: Negative. Psychiatric/Behavioral: Negative.

## 2019-04-04 ENCOUNTER — TELEPHONE (OUTPATIENT)
Dept: ORTHOPEDIC SURGERY | Age: 54
End: 2019-04-04

## 2019-04-04 RX ORDER — MELOXICAM 15 MG/1
15 TABLET ORAL DAILY
Qty: 30 TABLET | Refills: 3 | Status: SHIPPED | OUTPATIENT
Start: 2019-04-04

## 2019-05-01 ENCOUNTER — OFFICE VISIT (OUTPATIENT)
Dept: ORTHOPEDIC SURGERY | Age: 54
End: 2019-05-01
Payer: COMMERCIAL

## 2019-05-01 VITALS
SYSTOLIC BLOOD PRESSURE: 113 MMHG | HEART RATE: 77 BPM | HEIGHT: 69 IN | WEIGHT: 181 LBS | BODY MASS INDEX: 26.81 KG/M2 | DIASTOLIC BLOOD PRESSURE: 69 MMHG

## 2019-05-01 DIAGNOSIS — M19.011 ARTHRITIS OF RIGHT ACROMIOCLAVICULAR JOINT: Primary | ICD-10-CM

## 2019-05-01 DIAGNOSIS — M75.21 BICEPS TENDINITIS OF RIGHT UPPER EXTREMITY: ICD-10-CM

## 2019-05-01 DIAGNOSIS — M75.81 TENDINITIS OF RIGHT ROTATOR CUFF: ICD-10-CM

## 2019-05-01 DIAGNOSIS — M19.011 PRIMARY OSTEOARTHRITIS OF RIGHT SHOULDER: ICD-10-CM

## 2019-05-01 PROCEDURE — 99214 OFFICE O/P EST MOD 30 MIN: CPT | Performed by: ORTHOPAEDIC SURGERY

## 2019-05-01 PROCEDURE — 3017F COLORECTAL CA SCREEN DOC REV: CPT | Performed by: ORTHOPAEDIC SURGERY

## 2019-05-01 PROCEDURE — G8419 CALC BMI OUT NRM PARAM NOF/U: HCPCS | Performed by: ORTHOPAEDIC SURGERY

## 2019-05-01 PROCEDURE — 1036F TOBACCO NON-USER: CPT | Performed by: ORTHOPAEDIC SURGERY

## 2019-05-01 PROCEDURE — G8427 DOCREV CUR MEDS BY ELIG CLIN: HCPCS | Performed by: ORTHOPAEDIC SURGERY

## 2019-05-01 NOTE — PROGRESS NOTES
[address]  Right  Upper Extremity Pain    Chief Complaint    Follow-up (right shoulder )      Pain Assessment  Location of Pain: Shoulder  Location Modifiers: Right  Severity of Pain: 1  Quality of Pain: Aching  Duration of Pain: A few hours  Frequency of Pain: Intermittent  Aggravating Factors: Other (Comment)(movements)  Relieving Factors: Rest  Result of Injury: No  Work-Related Injury: No  Are there other pain locations you wish to document?: No    History of Present Illness:  Amado Gross is a 52 y.o. female with a history of recurrent dislocations treated with a soft tissue stabilization procedure performed by Dr. Adali Oropeza in [place] [address] presenting for repeat follow-up of her right shoulder pain. Patient noted to have large bone spur on the anterior aspect of the proximal humerus and mild arthritic changes, however, an MRI was ordered at her last visit to visualize the surrounding cuff integrity as well as that of her biceps. Anti-inflammatory meds assist with pain, and she is not interested in CSI's. No other issues reported. Medical History:    Review of Systems    Last updated on 4/3/19       Patient's medications, allergies, past medical, surgical, social and family histories were reviewed and updated as appropriate.     Past Medical History:   Diagnosis Date    Arthritis     PONV (postoperative nausea and vomiting)       Past Surgical History:   Procedure Laterality Date    ANTERIOR CRUCIATE LIGAMENT REPAIR Bilateral     APPENDECTOMY      KNEE ARTHROPLASTY Right 04/25/2017    KNEE ARTHROSCOPY Bilateral     OTHER SURGICAL HISTORY  2/20/2015    RIGHT KNEE ARTHROSCOPY, MEDIAL MENISCECTOMY    OTHER SURGICAL HISTORY Left 08/25/2015    LEFT TOTAL KNEE ARTHROPLASTY              SHOULDER SURGERY Right     TONSILLECTOMY      WISDOM TOOTH EXTRACTION       Social History     Socioeconomic History    Marital status:      Spouse name: Not on file    Number of children: Not on file    Years of education: Not on file    Highest education level: Not on file   Occupational History    Not on file   Social Needs    Financial resource strain: Not on file    Food insecurity:     Worry: Not on file     Inability: Not on file    Transportation needs:     Medical: Not on file     Non-medical: Not on file   Tobacco Use    Smoking status: Never Smoker    Smokeless tobacco: Never Used   Substance and Sexual Activity    Alcohol use: Yes     Comment: occasional    Drug use: No    Sexual activity: Not on file   Lifestyle    Physical activity:     Days per week: Not on file     Minutes per session: Not on file    Stress: Not on file   Relationships    Social connections:     Talks on phone: Not on file     Gets together: Not on file     Attends Yazidi service: Not on file     Active member of club or organization: Not on file     Attends meetings of clubs or organizations: Not on file     Relationship status: Not on file    Intimate partner violence:     Fear of current or ex partner: Not on file     Emotionally abused: Not on file     Physically abused: Not on file     Forced sexual activity: Not on file   Other Topics Concern    Not on file   Social History Narrative    Not on file       No Known Allergies  Current Outpatient Medications on File Prior to Visit   Medication Sig Dispense Refill    meloxicam (MOBIC) 15 MG tablet Take 1 tablet by mouth daily 30 tablet 3    montelukast (SINGULAIR) 10 MG tablet Take 10 mg by mouth nightly      fluticasone (FLONASE) 50 MCG/ACT nasal spray 1 spray by Nasal route daily      Multiple Vitamin (MULTI VITAMIN DAILY) TABS Take by mouth daily       cetirizine (ZYRTEC) 10 MG tablet Take 10 mg by mouth daily.        No current facility-administered medications on file prior to visit.           Review of Systems:  A 14 point review of systems available in the scanned medical record under the media tab, as documented by the patient. The review is negative with the exception of those things mentioned in the HPI and Past Medical History. Vital Signs:  Vitals:    05/01/19 1510   BP: 113/69   Pulse: 77       General Exam:   Well nourished, and groomed. Awake, Alert, Oriented to Person/Place/Time  No acute distress    Upper Extremity:    Right shoulder exam    Inspection:  No gross deformities, periscapular musculature is symmetry without atrophy, no obvious winging    Palpation: GH tenderness with crepitation within the joint particularly with internal rotation    Active/Passive ROM:     Full symmetrical elevation of the arm as well as abduction. Limited external rotation to approximately 40 degrees compared to 50-60. Strength: 4+/5 strength testing of supra and infraspinatus with pain; 5/5 subscap with pain    Stability: negative sulcus sign, no evidence of instability    Neurovascular:   2+ Radial pulses with capillary refill brisk <2 seconds. 2/2 sensation to light touch in C5-T1 distributions tested. Special Tests: + fletcher's speeds and yergassons.   + imingement signs      Left comparison shoulder exam    Inspection:  No gross deformities, periscapular musculature is symmetrical without atrophy, no obvious winging    Palpation: Nontender to palpation about the acromioclavicular joint, rotator cuff footprint or over the biceps groove, or any other bony architecture    Active/Passive ROM: full in forward flexion, abduction, external rotation with the elbow at the side, and internal rotation    Strength: 5/5 strength testing of deltoid, supraspinatus, infraspinatus, teres minor, and subscapularis    Stability: negative sulcus sign, no evidence of instability    Neurovascular: Neurovascularly intact      Imaging:     Multisequence multiplanar MRI of the right shoulder taken at [facility] demonstrates extensive biceps tendinopathy with a heavy amount of fluid within the bicipital groove in addition to an anterior and concerns have been addressed. Kodi Cowart MD  Fellow, [address]  Date:    5/1/2019    The encounter with Simon Hinkle was supervised by Dr Derick Zapien, who personally examined the patient and reviewed the plan. This dictation was performed with a verbal recognition program (DRAGON) and it was checked for errors. It is possible that there are still dictated errors within this office note. If so, please bring any errors to my attention for an addendum. All efforts were made to ensure that this office note is accurate.   _____________________  I was physically present and personally supervised the Orthopaedic Sports Medicine Fellow in the evaluation and development of a treatment plan for this patient. I personally interviewed the patient and performed a physical examination. In addition, I discussed the patient's condition and treatment options with them. I have also reviewed and agree with the past medical, family and social history unless otherwise noted. All of the patient's questions were answered. Elbert Zapien MD, PhD  5/1/2019

## 2019-05-06 ENCOUNTER — TELEPHONE (OUTPATIENT)
Dept: ORTHOPEDIC SURGERY | Age: 54
End: 2019-05-06

## 2019-05-06 NOTE — TELEPHONE ENCOUNTER
Called patient back for some clarification regarding this message. Surgery is 06/10/2019 her packet will be mailed out in the next week.

## 2019-05-06 NOTE — TELEPHONE ENCOUNTER
PT SAID THAT SHE HAS NOT RECEIVED HER SURGERY PACKET AND SHE IS GOING FOR HER PHYSICAL TODAY. PLEASE CALL PT AT [phone].

## 2019-05-08 ENCOUNTER — TELEPHONE (OUTPATIENT)
Dept: ORTHOPEDIC SURGERY | Age: 54
End: 2019-05-08

## 2019-05-30 NOTE — PROGRESS NOTES
Treatment order in effect during my hospitalization, the order may or may not be in effect during this procedure. I give my doctor permission to give me blood or blood products. I understand that there are risks with receiving blood such as hepatitis, AIDS, fever, or allergic reaction. I acknowledge that the risks, benefits, and alternatives of this treatment have been explained to me and that no express or implied warranty has been given by the hospital, any blood bank, or any person or entity as to the blood or blood components transfused. At the discretion of my doctor, I agree to allow observers, equipment/product representatives and allow photographing, and/or televising of the procedure, provided my name or identity is maintained confidentially. I agree the hospital may dispose of or use for scientific or educational purposes any tissue, fluid, or body parts which may be removed.     ________________________________Date________Time______ am/pm  ([place])  Patient or Signature of Closest Relative or Legal Guardian    ________________________________Date________Time______am/pm      Page 1 of  1  Witness

## 2019-06-06 NOTE — PROGRESS NOTES
[address]                          Date of Procedure 6/10/19 Time of Procedure 1055    PRIOR TO PROCEDURE DATE:  1. Please follow any guidelines/instructions prior to your procedure as advised by your surgeon. 2. Arrange for someone to drive you home and be with you for the first 24 hours after discharge for your safety after your procedure for which you received sedation. Ensure it is someone we can share information with regarding your discharge. 3. You must contact your surgeon for instructions IF:   You are taking any blood thinners, aspirin, anti-inflammatory or vitamin E.   There is a change in your physical condition such as a cold, fever, rash, cuts, sores or any other infection, especially near your surgical site. 4. Do not drink alcohol the day before or day of your procedure. 5. A Pre-op History and Physical for surgery MUST be completed by your Physician or Urgent Care within 30 days of your procedure date. Please bring a copy with you on the day of your procedure and along with any other testing performed. THE DAY OF YOUR PROCEDURE:  1. Follow instructions for ARRIVAL TIME as DIRECTED BY YOUR SURGEON. If your surgeon does not give you a specific arrival time, please arrive at 0900    2. Enter the MAIN entrance from [employer] and follow the signs to the free [employer] or [employer] parking (offered free of charge 6am-5pm). 3. Enter the Main Entrance of the hospital (do not enter from the lower level of the parking garage). Upon entrance, check in with the  at the main desk on your left. If no one is available at the desk, proceed into the [facility] Waiting Room and go through the door directly into the [facility]. There is a Check-in desk ACROSS from Room 5 (marked with a sign hanging from the ceiling). The phone number for the surgery center is [phone].     4. Please call [phone] option #2 option #2 if you have not been preregistered yet. On the day of your procedure bring your insurance card and photo ID. You will be registered at your bedside once brought back to your room. 5. DO NOT EAT ANYTHING eight hours prior to surgery. May have 8 ounces of water 4 hours prior to surgery. 6. MEDICATIONS    Take the following medications with a SMALL sip of water:    Use your usual dose of inhalers the morning of surgery. BRING your rescue inhaler with you to hospital.    Anesthesia does NOT want you to take insulin the morning of surgery. They will control your blood sugar while you are at the hospital. Please contact your ordering physician for instructions regarding your insulin the night before your procedure. If you have an insulin pump, please keep it set on basal rate. 7. Do not swallow water when brushing teeth. No gum, candy, mints or ice chips. Refrain from smoking or at least decrease the amount. 8. Dress in loose, comfortable clothing appropriate for redressing after your procedure. Do not wear jewelry (including body piercings), make-up (especially NO eye make-up), fingernail polish (NO toenail polish if foot/leg surgery), lotion, powders or metal hairclips. 9. Dentures, glasses, or contacts will need to be removed before your procedure. Bring cases for your glasses, contacts, dentures, or hearing aids to protect them while you are in surgery. 10. If you use a CPAP, please bring it with you on the day of your procedure. 11. We recommend that valuable personal  belongings such as cash, cell phones, e-tablets or jewelry, be left at home during your stay. The hospital will not be responsible for valuables that are not secured in the hospital safe. However, if your insurance requires a co-pay, you may want to bring a method of payment, i.e. Check or credit card, if you wish to pay your co-pay the day of surgery.       12. If you are to stay overnight, you may bring a bag with

## 2019-06-07 ENCOUNTER — ANESTHESIA EVENT (OUTPATIENT)
Dept: OPERATING ROOM | Age: 54
End: 2019-06-07
Payer: COMMERCIAL

## 2019-06-10 ENCOUNTER — TELEPHONE (OUTPATIENT)
Dept: ORTHOPEDIC SURGERY | Age: 54
End: 2019-06-10

## 2019-06-10 ENCOUNTER — HOSPITAL ENCOUNTER (OUTPATIENT)
Age: 54
Setting detail: OUTPATIENT SURGERY
Discharge: HOME | End: 2019-06-10
Attending: ORTHOPAEDIC SURGERY | Admitting: ORTHOPAEDIC SURGERY
Payer: COMMERCIAL

## 2019-06-10 ENCOUNTER — ANESTHESIA (OUTPATIENT)
Dept: OPERATING ROOM | Age: 54
End: 2019-06-10
Payer: COMMERCIAL

## 2019-06-10 VITALS
DIASTOLIC BLOOD PRESSURE: 90 MMHG | OXYGEN SATURATION: 93 % | SYSTOLIC BLOOD PRESSURE: 141 MMHG | HEART RATE: 99 BPM | BODY MASS INDEX: 26.66 KG/M2 | TEMPERATURE: 97.5 F | WEIGHT: 180 LBS | RESPIRATION RATE: 18 BRPM | HEIGHT: 69 IN

## 2019-06-10 VITALS — TEMPERATURE: 59.9 F | SYSTOLIC BLOOD PRESSURE: 99 MMHG | DIASTOLIC BLOOD PRESSURE: 55 MMHG | OXYGEN SATURATION: 94 %

## 2019-06-10 PROBLEM — Z98.890 S/P ARTHROSCOPY OF RIGHT SHOULDER: Status: ACTIVE | Noted: 2019-06-10

## 2019-06-10 PROCEDURE — 3700000000 HC ANESTHESIA ATTENDED CARE: Performed by: ORTHOPAEDIC SURGERY

## 2019-06-10 PROCEDURE — 2720000010 HC SURG SUPPLY STERILE: Performed by: ORTHOPAEDIC SURGERY

## 2019-06-10 PROCEDURE — 2709999900 HC NON-CHARGEABLE SUPPLY: Performed by: ORTHOPAEDIC SURGERY

## 2019-06-10 PROCEDURE — 6360000002 HC RX W HCPCS: Performed by: ANESTHESIOLOGY

## 2019-06-10 PROCEDURE — 7100000010 HC PHASE II RECOVERY - FIRST 15 MIN: Performed by: ORTHOPAEDIC SURGERY

## 2019-06-10 PROCEDURE — 3700000001 HC ADD 15 MINUTES (ANESTHESIA): Performed by: ORTHOPAEDIC SURGERY

## 2019-06-10 PROCEDURE — 6360000002 HC RX W HCPCS: Performed by: ORTHOPAEDIC SURGERY

## 2019-06-10 PROCEDURE — 6360000002 HC RX W HCPCS: Performed by: NURSE ANESTHETIST, CERTIFIED REGISTERED

## 2019-06-10 PROCEDURE — 2500000003 HC RX 250 WO HCPCS: Performed by: NURSE ANESTHETIST, CERTIFIED REGISTERED

## 2019-06-10 PROCEDURE — C1713 ANCHOR/SCREW BN/BN,TIS/BN: HCPCS | Performed by: ORTHOPAEDIC SURGERY

## 2019-06-10 PROCEDURE — 7100000001 HC PACU RECOVERY - ADDTL 15 MIN: Performed by: ORTHOPAEDIC SURGERY

## 2019-06-10 PROCEDURE — 7100000000 HC PACU RECOVERY - FIRST 15 MIN: Performed by: ORTHOPAEDIC SURGERY

## 2019-06-10 PROCEDURE — 3600000014 HC SURGERY LEVEL 4 ADDTL 15MIN: Performed by: ORTHOPAEDIC SURGERY

## 2019-06-10 PROCEDURE — 3600000004 HC SURGERY LEVEL 4 BASE: Performed by: ORTHOPAEDIC SURGERY

## 2019-06-10 PROCEDURE — 2580000003 HC RX 258: Performed by: ORTHOPAEDIC SURGERY

## 2019-06-10 PROCEDURE — 76942 ECHO GUIDE FOR BIOPSY: CPT | Performed by: ANESTHESIOLOGY

## 2019-06-10 PROCEDURE — L3660 SO 8 AB RSTR CAN/WEB PRE OTS: HCPCS | Performed by: ORTHOPAEDIC SURGERY

## 2019-06-10 PROCEDURE — 88305 TISSUE EXAM BY PATHOLOGIST: CPT

## 2019-06-10 PROCEDURE — 2580000003 HC RX 258: Performed by: ANESTHESIOLOGY

## 2019-06-10 PROCEDURE — 7100000011 HC PHASE II RECOVERY - ADDTL 15 MIN: Performed by: ORTHOPAEDIC SURGERY

## 2019-06-10 PROCEDURE — L3650 SO 8 ABD RESTRAINT PRE OTS: HCPCS | Performed by: ORTHOPAEDIC SURGERY

## 2019-06-10 DEVICE — SCREW INTFR L15MM DIA5.5MM W/ SZ 2 FIBERWIRE SUT AND DISP: Type: IMPLANTABLE DEVICE | Site: SHOULDER | Status: FUNCTIONAL

## 2019-06-10 RX ORDER — ONDANSETRON 2 MG/ML
INJECTION INTRAMUSCULAR; INTRAVENOUS PRN
Status: DISCONTINUED | OUTPATIENT
Start: 2019-06-10 | End: 2019-06-10 | Stop reason: SDUPTHER

## 2019-06-10 RX ORDER — SODIUM CHLORIDE 0.9 % (FLUSH) 0.9 %
10 SYRINGE (ML) INJECTION EVERY 12 HOURS SCHEDULED
Status: DISCONTINUED | OUTPATIENT
Start: 2019-06-10 | End: 2019-06-10 | Stop reason: HOSPADM

## 2019-06-10 RX ORDER — ROPIVACAINE HYDROCHLORIDE 5 MG/ML
INJECTION, SOLUTION EPIDURAL; INFILTRATION; PERINEURAL
Status: COMPLETED
Start: 2019-06-10 | End: 2019-06-10

## 2019-06-10 RX ORDER — ROPIVACAINE HYDROCHLORIDE 5 MG/ML
INJECTION, SOLUTION EPIDURAL; INFILTRATION; PERINEURAL PRN
Status: DISCONTINUED | OUTPATIENT
Start: 2019-06-10 | End: 2019-06-10 | Stop reason: SDUPTHER

## 2019-06-10 RX ORDER — MORPHINE SULFATE 4 MG/ML
2 INJECTION, SOLUTION INTRAMUSCULAR; INTRAVENOUS EVERY 5 MIN PRN
Status: DISCONTINUED | OUTPATIENT
Start: 2019-06-10 | End: 2019-06-10 | Stop reason: HOSPADM

## 2019-06-10 RX ORDER — PROPOFOL 10 MG/ML
INJECTION, EMULSION INTRAVENOUS PRN
Status: DISCONTINUED | OUTPATIENT
Start: 2019-06-10 | End: 2019-06-10 | Stop reason: SDUPTHER

## 2019-06-10 RX ORDER — EPHEDRINE SULFATE 50 MG/ML
INJECTION, SOLUTION INTRAVENOUS PRN
Status: DISCONTINUED | OUTPATIENT
Start: 2019-06-10 | End: 2019-06-10 | Stop reason: SDUPTHER

## 2019-06-10 RX ORDER — ROCURONIUM BROMIDE 10 MG/ML
INJECTION, SOLUTION INTRAVENOUS PRN
Status: DISCONTINUED | OUTPATIENT
Start: 2019-06-10 | End: 2019-06-10 | Stop reason: SDUPTHER

## 2019-06-10 RX ORDER — DEXAMETHASONE SODIUM PHOSPHATE 4 MG/ML
INJECTION, SOLUTION INTRA-ARTICULAR; INTRALESIONAL; INTRAMUSCULAR; INTRAVENOUS; SOFT TISSUE PRN
Status: DISCONTINUED | OUTPATIENT
Start: 2019-06-10 | End: 2019-06-10 | Stop reason: SDUPTHER

## 2019-06-10 RX ORDER — MEPERIDINE HYDROCHLORIDE 25 MG/ML
12.5 INJECTION INTRAMUSCULAR; INTRAVENOUS; SUBCUTANEOUS EVERY 5 MIN PRN
Status: DISCONTINUED | OUTPATIENT
Start: 2019-06-10 | End: 2019-06-10 | Stop reason: HOSPADM

## 2019-06-10 RX ORDER — OXYCODONE HYDROCHLORIDE AND ACETAMINOPHEN 5; 325 MG/1; MG/1
1 TABLET ORAL EVERY 6 HOURS PRN
Qty: 28 TABLET | Refills: 0 | Status: SHIPPED | OUTPATIENT
Start: 2019-06-10 | End: 2019-06-17

## 2019-06-10 RX ORDER — SODIUM CHLORIDE, SODIUM LACTATE, POTASSIUM CHLORIDE, CALCIUM CHLORIDE 600; 310; 30; 20 MG/100ML; MG/100ML; MG/100ML; MG/100ML
INJECTION, SOLUTION INTRAVENOUS CONTINUOUS
Status: DISCONTINUED | OUTPATIENT
Start: 2019-06-10 | End: 2019-06-10 | Stop reason: HOSPADM

## 2019-06-10 RX ORDER — LABETALOL HYDROCHLORIDE 5 MG/ML
5 INJECTION, SOLUTION INTRAVENOUS EVERY 10 MIN PRN
Status: DISCONTINUED | OUTPATIENT
Start: 2019-06-10 | End: 2019-06-10 | Stop reason: HOSPADM

## 2019-06-10 RX ORDER — MIDAZOLAM HYDROCHLORIDE 1 MG/ML
2 INJECTION INTRAMUSCULAR; INTRAVENOUS
Status: COMPLETED | OUTPATIENT
Start: 2019-06-10 | End: 2019-06-10

## 2019-06-10 RX ORDER — GLYCOPYRROLATE 1 MG/5 ML
SYRINGE (ML) INTRAVENOUS PRN
Status: DISCONTINUED | OUTPATIENT
Start: 2019-06-10 | End: 2019-06-10 | Stop reason: SDUPTHER

## 2019-06-10 RX ORDER — PROMETHAZINE HYDROCHLORIDE 12.5 MG/1
12.5 TABLET ORAL 4 TIMES DAILY PRN
Qty: 12 TABLET | Refills: 0 | Status: SHIPPED | OUTPATIENT
Start: 2019-06-10 | End: 2019-06-17 | Stop reason: ALTCHOICE

## 2019-06-10 RX ORDER — DOCUSATE SODIUM 100 MG/1
100 CAPSULE, LIQUID FILLED ORAL 2 TIMES DAILY
Qty: 30 CAPSULE | Refills: 0 | Status: SHIPPED | OUTPATIENT
Start: 2019-06-10 | End: 2019-06-17 | Stop reason: ALTCHOICE

## 2019-06-10 RX ORDER — CEFAZOLIN SODIUM 2 G/50ML
2 SOLUTION INTRAVENOUS ONCE
Status: COMPLETED | OUTPATIENT
Start: 2019-06-10 | End: 2019-06-10

## 2019-06-10 RX ORDER — SODIUM CHLORIDE 0.9 % (FLUSH) 0.9 %
10 SYRINGE (ML) INJECTION PRN
Status: DISCONTINUED | OUTPATIENT
Start: 2019-06-10 | End: 2019-06-10 | Stop reason: HOSPADM

## 2019-06-10 RX ORDER — FENTANYL CITRATE 50 UG/ML
50 INJECTION, SOLUTION INTRAMUSCULAR; INTRAVENOUS EVERY 5 MIN PRN
Status: DISCONTINUED | OUTPATIENT
Start: 2019-06-10 | End: 2019-06-10 | Stop reason: HOSPADM

## 2019-06-10 RX ORDER — SUCCINYLCHOLINE/SOD CL,ISO/PF 100 MG/5ML
SYRINGE (ML) INTRAVENOUS PRN
Status: DISCONTINUED | OUTPATIENT
Start: 2019-06-10 | End: 2019-06-10 | Stop reason: SDUPTHER

## 2019-06-10 RX ORDER — LIDOCAINE HYDROCHLORIDE 20 MG/ML
INJECTION, SOLUTION INTRAVENOUS PRN
Status: DISCONTINUED | OUTPATIENT
Start: 2019-06-10 | End: 2019-06-10 | Stop reason: SDUPTHER

## 2019-06-10 RX ORDER — ONDANSETRON 2 MG/ML
4 INJECTION INTRAMUSCULAR; INTRAVENOUS
Status: DISCONTINUED | OUTPATIENT
Start: 2019-06-10 | End: 2019-06-10 | Stop reason: HOSPADM

## 2019-06-10 RX ORDER — FENTANYL CITRATE 50 UG/ML
100 INJECTION, SOLUTION INTRAMUSCULAR; INTRAVENOUS ONCE
Status: DISCONTINUED | OUTPATIENT
Start: 2019-06-10 | End: 2019-06-10 | Stop reason: HOSPADM

## 2019-06-10 RX ADMIN — LIDOCAINE HYDROCHLORIDE 50 MG: 20 INJECTION, SOLUTION INTRAVENOUS at 12:10

## 2019-06-10 RX ADMIN — SUGAMMADEX 100 MG: 100 INJECTION, SOLUTION INTRAVENOUS at 14:00

## 2019-06-10 RX ADMIN — ROPIVACAINE HYDROCHLORIDE 40 ML: 5 INJECTION, SOLUTION EPIDURAL; INFILTRATION; PERINEURAL at 11:45

## 2019-06-10 RX ADMIN — PHENYLEPHRINE HYDROCHLORIDE 100 MCG: 10 INJECTION, SOLUTION INTRAMUSCULAR; INTRAVENOUS; SUBCUTANEOUS at 12:23

## 2019-06-10 RX ADMIN — EPHEDRINE SULFATE 10 MG: 50 INJECTION, SOLUTION INTRAMUSCULAR; INTRAVENOUS; SUBCUTANEOUS at 13:05

## 2019-06-10 RX ADMIN — EPHEDRINE SULFATE 10 MG: 50 INJECTION, SOLUTION INTRAMUSCULAR; INTRAVENOUS; SUBCUTANEOUS at 13:22

## 2019-06-10 RX ADMIN — SODIUM CHLORIDE, SODIUM LACTATE, POTASSIUM CHLORIDE, AND CALCIUM CHLORIDE: 600; 310; 30; 20 INJECTION, SOLUTION INTRAVENOUS at 13:21

## 2019-06-10 RX ADMIN — PHENYLEPHRINE HYDROCHLORIDE 100 MCG: 10 INJECTION, SOLUTION INTRAMUSCULAR; INTRAVENOUS; SUBCUTANEOUS at 12:21

## 2019-06-10 RX ADMIN — EPHEDRINE SULFATE 10 MG: 50 INJECTION, SOLUTION INTRAMUSCULAR; INTRAVENOUS; SUBCUTANEOUS at 12:35

## 2019-06-10 RX ADMIN — ROCURONIUM BROMIDE 40 MG: 10 INJECTION, SOLUTION INTRAVENOUS at 12:19

## 2019-06-10 RX ADMIN — PHENYLEPHRINE HYDROCHLORIDE 100 MCG: 10 INJECTION, SOLUTION INTRAMUSCULAR; INTRAVENOUS; SUBCUTANEOUS at 12:37

## 2019-06-10 RX ADMIN — EPHEDRINE SULFATE 10 MG: 50 INJECTION, SOLUTION INTRAMUSCULAR; INTRAVENOUS; SUBCUTANEOUS at 12:52

## 2019-06-10 RX ADMIN — PHENYLEPHRINE HYDROCHLORIDE 100 MCG: 10 INJECTION, SOLUTION INTRAMUSCULAR; INTRAVENOUS; SUBCUTANEOUS at 12:43

## 2019-06-10 RX ADMIN — EPHEDRINE SULFATE 10 MG: 50 INJECTION, SOLUTION INTRAMUSCULAR; INTRAVENOUS; SUBCUTANEOUS at 12:43

## 2019-06-10 RX ADMIN — SODIUM CHLORIDE, SODIUM LACTATE, POTASSIUM CHLORIDE, AND CALCIUM CHLORIDE: 600; 310; 30; 20 INJECTION, SOLUTION INTRAVENOUS at 12:02

## 2019-06-10 RX ADMIN — PHENYLEPHRINE HYDROCHLORIDE 100 MCG: 10 INJECTION, SOLUTION INTRAMUSCULAR; INTRAVENOUS; SUBCUTANEOUS at 12:49

## 2019-06-10 RX ADMIN — DEXAMETHASONE SODIUM PHOSPHATE 4 MG: 4 INJECTION, SOLUTION INTRAMUSCULAR; INTRAVENOUS at 13:39

## 2019-06-10 RX ADMIN — Medication 0.4 MG: at 12:54

## 2019-06-10 RX ADMIN — MIDAZOLAM 2 MG: 1 INJECTION INTRAMUSCULAR; INTRAVENOUS at 11:41

## 2019-06-10 RX ADMIN — PHENYLEPHRINE HYDROCHLORIDE 100 MCG: 10 INJECTION, SOLUTION INTRAMUSCULAR; INTRAVENOUS; SUBCUTANEOUS at 12:52

## 2019-06-10 RX ADMIN — SODIUM CHLORIDE, SODIUM LACTATE, POTASSIUM CHLORIDE, AND CALCIUM CHLORIDE: 600; 310; 30; 20 INJECTION, SOLUTION INTRAVENOUS at 09:31

## 2019-06-10 RX ADMIN — ONDANSETRON 4 MG: 2 INJECTION INTRAMUSCULAR; INTRAVENOUS at 13:39

## 2019-06-10 RX ADMIN — Medication 120 MG: at 12:10

## 2019-06-10 RX ADMIN — CEFAZOLIN SODIUM 2 G: 2 SOLUTION INTRAVENOUS at 12:27

## 2019-06-10 RX ADMIN — PROPOFOL 160 MG: 10 INJECTION, EMULSION INTRAVENOUS at 12:10

## 2019-06-10 ASSESSMENT — PULMONARY FUNCTION TESTS
PIF_VALUE: 26
PIF_VALUE: 26
PIF_VALUE: 20
PIF_VALUE: 25
PIF_VALUE: 26
PIF_VALUE: 20
PIF_VALUE: 26
PIF_VALUE: 25
PIF_VALUE: 25
PIF_VALUE: 26
PIF_VALUE: 19
PIF_VALUE: 19
PIF_VALUE: 26
PIF_VALUE: 20
PIF_VALUE: 26
PIF_VALUE: 20
PIF_VALUE: 26
PIF_VALUE: 20
PIF_VALUE: 19
PIF_VALUE: 27
PIF_VALUE: 27
PIF_VALUE: 1
PIF_VALUE: 26
PIF_VALUE: 20
PIF_VALUE: 0
PIF_VALUE: 26
PIF_VALUE: 26
PIF_VALUE: 19
PIF_VALUE: 1
PIF_VALUE: 25
PIF_VALUE: 26
PIF_VALUE: 25
PIF_VALUE: 26
PIF_VALUE: 6
PIF_VALUE: 19
PIF_VALUE: 25
PIF_VALUE: 26
PIF_VALUE: 21
PIF_VALUE: 1
PIF_VALUE: 25
PIF_VALUE: 26
PIF_VALUE: 19
PIF_VALUE: 15
PIF_VALUE: 26
PIF_VALUE: 26
PIF_VALUE: 31
PIF_VALUE: 1
PIF_VALUE: 25
PIF_VALUE: 18
PIF_VALUE: 20
PIF_VALUE: 30
PIF_VALUE: 27
PIF_VALUE: 25
PIF_VALUE: 20
PIF_VALUE: 27
PIF_VALUE: 25
PIF_VALUE: 19
PIF_VALUE: 32
PIF_VALUE: 26
PIF_VALUE: 27
PIF_VALUE: 1
PIF_VALUE: 0
PIF_VALUE: 25
PIF_VALUE: 0
PIF_VALUE: 20
PIF_VALUE: 15
PIF_VALUE: 26
PIF_VALUE: 18
PIF_VALUE: 24
PIF_VALUE: 19
PIF_VALUE: 26
PIF_VALUE: 25
PIF_VALUE: 25
PIF_VALUE: 20
PIF_VALUE: 20
PIF_VALUE: 15
PIF_VALUE: 27
PIF_VALUE: 20
PIF_VALUE: 1
PIF_VALUE: 16
PIF_VALUE: 19
PIF_VALUE: 20
PIF_VALUE: 25
PIF_VALUE: 26
PIF_VALUE: 19
PIF_VALUE: 25
PIF_VALUE: 19
PIF_VALUE: 26
PIF_VALUE: 25
PIF_VALUE: 19
PIF_VALUE: 26
PIF_VALUE: 24
PIF_VALUE: 19
PIF_VALUE: 26
PIF_VALUE: 15
PIF_VALUE: 16
PIF_VALUE: 25
PIF_VALUE: 22
PIF_VALUE: 26
PIF_VALUE: 20
PIF_VALUE: 26
PIF_VALUE: 25
PIF_VALUE: 20
PIF_VALUE: 26
PIF_VALUE: 27
PIF_VALUE: 27
PIF_VALUE: 19
PIF_VALUE: 19
PIF_VALUE: 26
PIF_VALUE: 25
PIF_VALUE: 26
PIF_VALUE: 30
PIF_VALUE: 20
PIF_VALUE: 1
PIF_VALUE: 26
PIF_VALUE: 25
PIF_VALUE: 19
PIF_VALUE: 20

## 2019-06-10 ASSESSMENT — PAIN SCALES - GENERAL
PAINLEVEL_OUTOF10: 0

## 2019-06-10 ASSESSMENT — PAIN - FUNCTIONAL ASSESSMENT: PAIN_FUNCTIONAL_ASSESSMENT: 0-10

## 2019-06-10 ASSESSMENT — PAIN DESCRIPTION - DESCRIPTORS: DESCRIPTORS: ACHING

## 2019-06-10 NOTE — ANESTHESIA PRE PROCEDURE
(postoperative nausea and vomiting)        Past Surgical History:        Procedure Laterality Date    ANTERIOR CRUCIATE LIGAMENT REPAIR Bilateral     APPENDECTOMY      COLONOSCOPY      JOINT REPLACEMENT      gordon.knees    KNEE ARTHROPLASTY Right 04/25/2017    KNEE ARTHROSCOPY Bilateral     OTHER SURGICAL HISTORY  2/20/2015    RIGHT KNEE ARTHROSCOPY, MEDIAL MENISCECTOMY    OTHER SURGICAL HISTORY Left 08/25/2015    LEFT TOTAL KNEE ARTHROPLASTY              SHOULDER SURGERY Right     TONSILLECTOMY      WISDOM TOOTH EXTRACTION         Social History:    Social History     Tobacco Use    Smoking status: Never Smoker    Smokeless tobacco: Never Used   Substance Use Topics    Alcohol use: Yes     Comment: occasional                                Counseling given: Not Answered      Vital Signs (Current):   Vitals:    06/06/19 1348 06/10/19 0910   BP:  138/87   Pulse:  71   Resp:  16   Temp:  98.2 °F (36.8 °C)   TempSrc:  Oral   SpO2:  97%   Weight: 180 lb (81.6 kg) 180 lb (81.6 kg)   Height: 5' 9\" (1.753 m) 5' 9\" (1.753 m)                                              BP Readings from Last 3 Encounters:   06/10/19 138/87   05/01/19 113/69   04/03/19 123/81       NPO Status: Time of last liquid consumption: 2000                        Time of last solid consumption: 1900                        Date of last liquid consumption: 06/09/19                        Date of last solid food consumption: 06/09/19    BMI:   Wt Readings from Last 3 Encounters:   06/10/19 180 lb (81.6 kg)   05/01/19 181 lb (82.1 kg)   04/03/19 181 lb (82.1 kg)     Body mass index is 26.58 kg/m².     CBC:   Lab Results   Component Value Date    WBC 4.0 04/04/2017    RBC 4.69 04/04/2017    HGB 11.8 04/26/2017    HCT 35.1 04/26/2017    MCV 85.8 04/04/2017    RDW 14.5 04/04/2017     04/04/2017       CMP:   Lab Results   Component Value Date     04/04/2017    K 4.4 04/04/2017     04/04/2017    CO2 26 04/04/2017    BUN 20 04/04/2017    CREATININE 0.9 04/04/2017    GFRAA >60 04/04/2017    LABGLOM >60 04/04/2017    GLUCOSE 92 04/04/2017    CALCIUM 9.5 04/04/2017       POC Tests: No results for input(s): POCGLU, POCNA, POCK, POCCL, POCBUN, POCHEMO, POCHCT in the last 72 hours. Coags:   Lab Results   Component Value Date    PROTIME 11.5 04/25/2017    INR 1.02 04/25/2017    APTT 31.1 04/04/2017       HCG (If Applicable):   Lab Results   Component Value Date    PREGTESTUR Negative 04/25/2017        ABGs: No results found for: PHART, PO2ART, ANR2MPC, ICM3XVC, BEART, K3ONOSPB     Type & Screen (If Applicable):  No results found for: [facility]    Anesthesia Evaluation  Patient summary reviewed  Airway: Mallampati: II        Dental: normal exam         Pulmonary:Negative Pulmonary ROS                              Cardiovascular:Negative CV ROS                      Neuro/Psych:   Negative Neuro/Psych ROS              GI/Hepatic/Renal: Neg GI/Hepatic/Renal ROS            Endo/Other: Negative Endo/Other ROS                    Abdominal:           Vascular: negative vascular ROS. Anesthesia Plan      general and regional     ASA 2       Induction: intravenous. Anesthetic plan and risks discussed with patient. Plan discussed with CRNA.     Attending anesthesiologist reviewed and agrees with Shirlyn Mcburney, MD   6/10/2019

## 2019-06-10 NOTE — H&P
Shahab Henson    [id number]    [facility] Update H & P  Department of General Surgery   Surgical Service   Pre-operative History and Physical  Last H & P within the last 30 days. DIAGNOSIS:   RIGHT SHOULDER PAIN    PROCEDURE:  VASILIY HARRIS ARTHROSCOP,DIAGNOSTIC [61066] (SHOULDER ARTHROSCOPY ARTHROTOMY)     HISTORY OF PRESENT ILLNESS:    Patient with chronic right shoulder pain, weakness and limited ROM. The symptoms have been recalcitrant to conservative treatment and the patient presents today for the above procedure.      Past Medical History:        Diagnosis Date    Arthritis     PONV (postoperative nausea and vomiting)      Past Surgical History:        Procedure Laterality Date    ANTERIOR CRUCIATE LIGAMENT REPAIR Bilateral     APPENDECTOMY      COLONOSCOPY      JOINT REPLACEMENT      gordon.knees    KNEE ARTHROPLASTY Right 04/25/2017    KNEE ARTHROSCOPY Bilateral     OTHER SURGICAL HISTORY  2/20/2015    RIGHT KNEE ARTHROSCOPY, MEDIAL MENISCECTOMY    OTHER SURGICAL HISTORY Left 08/25/2015    LEFT TOTAL KNEE ARTHROPLASTY              SHOULDER SURGERY Right     TONSILLECTOMY      WISDOM TOOTH EXTRACTION       Past Social History:  Social History     Socioeconomic History    Marital status:      Spouse name: None    Number of children: None    Years of education: None    Highest education level: None   Occupational History    None   Social Needs    Financial resource strain: None    Food insecurity:     Worry: None     Inability: None    Transportation needs:     Medical: None     Non-medical: None   Tobacco Use    Smoking status: Never Smoker    Smokeless tobacco: Never Used   Substance and Sexual Activity    Alcohol use: Yes     Comment: occasional    Drug use: No    Sexual activity: None   Lifestyle    Physical activity:     Days per week: None     Minutes per session: None    Stress: None   Relationships    Social connections:     Talks on phone: None Gets together: None     Attends Hoahaoism service: None     Active member of club or organization: None     Attends meetings of clubs or organizations: None     Relationship status: None    Intimate partner violence:     Fear of current or ex partner: None     Emotionally abused: None     Physically abused: None     Forced sexual activity: None   Other Topics Concern    None   Social History Narrative    None         Medications Prior to Admission:      Prior to Admission medications    Medication Sig Start Date End Date Taking? Authorizing Provider   montelukast (SINGULAIR) 10 MG tablet Take 10 mg by mouth nightly   Yes Historical Provider, MD   fluticasone (FLONASE) 50 MCG/ACT nasal spray 1 spray by Nasal route daily   Yes Historical Provider, MD   Multiple Vitamin (MULTI VITAMIN DAILY) TABS Take by mouth daily    Yes Historical Provider, MD   cetirizine (ZYRTEC) 10 MG tablet Take 10 mg by mouth daily. Yes Historical Provider, MD   meloxicam (MOBIC) 15 MG tablet Take 1 tablet by mouth daily 4/4/19   Carlos Alberto Grant MD         Allergies:  Patient has no known allergies. PHYSICAL EXAM:      /87   Pulse 71   Temp 98.2 °F (36.8 °C) (Oral)   Resp 16   Ht 5' 9\" (1.753 m)   Wt 180 lb (81.6 kg)   LMP 03/14/2017 (Exact Date)   SpO2 97%   BMI 26.58 kg/m²      Heart:  Regular rate and rhythm, No murmur noted    Lungs: No increased work of breathing, good air exchange, clear to ausculation bilaterally     Abdomen:  Soft, non-distended, non-tender, normal active bowel sounds, no masses palpated    ASSESSMENT AND PLAN:    1. Patient seen and focused exam done today- no new changes since last physical exam on 5/23/19    2. Access to ancillary services are available per request of the provider.     BILLIE Conner - SHAUN     6/10/2019

## 2019-06-10 NOTE — ANESTHESIA POSTPROCEDURE EVALUATION
Department of Anesthesiology  Postprocedure Note    Patient: Daniel Story  MRN: [id number]  YOB: 1965  Date of evaluation: 6/10/2019  Time:  2:36 PM     Procedure Summary     Date:  06/10/19 Room / Location:  [facility] OR 08 / [facility] OR    Anesthesia Start:  1202 Anesthesia Stop:  1417    Procedure:  RIGHT SHOULDER ATHROSCOPY, ATHROSCOPIC DEBRIDEMENT DECOMPRESSION, LYSIS OF ADHESIONS, ARTHROSCOPIC EXOSTECTOMY, OPEN BICEPS TENODESIS (Right ) Diagnosis:  (RIGHT SHOULDER PAIN)    Surgeon:  Belkis Ocampo MD Responsible Provider:  Xin Hernandez MD    Anesthesia Type:  general, regional ASA Status:  2          Anesthesia Type: general, regional    David Phase I: David Score: 7    David Phase II:      Last vitals: Reviewed and per EMR flowsheets.        Anesthesia Post Evaluation    Patient location during evaluation: PACU  Patient participation: complete - patient participated  Level of consciousness: awake and alert  Airway patency: patent  Nausea & Vomiting: no vomiting and no nausea  Complications: no  Cardiovascular status: hemodynamically stable  Respiratory status: acceptable  Hydration status: euvolemic

## 2019-06-10 NOTE — ANESTHESIA PROCEDURE NOTES
INTERSCALENE    Patient location during procedure: pre-op  Start time: 6/10/2019 11:30 AM  End time: 6/10/2019 11:50 AM  Staffing  Anesthesiologist: Viraj Duarte MD  Performed: anesthesiologist   Preanesthetic Checklist  Completed: patient identified, site marked, surgical consent, pre-op evaluation, timeout performed, IV checked, risks and benefits discussed, monitors and equipment checked, anesthesia consent given, oxygen available and patient being monitored  Peripheral Block  Patient position: sitting  Prep: ChloraPrep  Patient monitoring: continuous pulse ox  Block type: Brachial plexus  Laterality: right  Injection technique: single-shot  Procedures: ultrasound guided  Interscalene  Provider prep: sterile gloves  Needle  Needle type: pencil-tip   Needle gauge: 20 G  Needle length: 10 cm  Needle localization: ultrasound guidance  Needle insertion depth: 4 cm  Assessment  Injection assessment: negative aspiration for heme  Slow fractionated injection: yes  Hemodynamics: stable  Additional Notes  40 ML 0.5 % ROPIVACAINE INJECTED AROUND BRACHIAL PLEXUS  Reason for block: post-op pain management

## 2019-06-10 NOTE — TELEPHONE ENCOUNTER
[facility] recovery calling to get order signed off by Ena Mora, they have tried calling him and left messages on [phone]    Also contacted mayito by text regarding this matter

## 2019-06-11 ENCOUNTER — TELEPHONE (OUTPATIENT)
Dept: ORTHOPEDIC SURGERY | Age: 54
End: 2019-06-11

## 2019-06-11 NOTE — OP NOTE
[address] [facility]             [address]                             OPERATIVE REPORT    PATIENT NAME: Sania Corcoran                      :         1965  MED REC NO:   [id number]                          ROOM:  ACCOUNT NO:   [de-identified]                           ADMIT DATE:  06/10/2019  PROVIDER:     Jovana Tam MD    DATE OF PROCEDURE:  06/10/2019    PREOPERATIVE DIAGNOSES:  Right shoulder impingement, postsurgical  adhesions, biceps tendinopathy, possible rotator cuff tear. POSTOPERATIVE DIAGNOSIS:  Right shoulder impingement, postsurgical  adhesions, biceps tendinopathy, no rotator cuff tear, no  glenohumeral arthritis, and bony excrescence of the lesser  tuberosity. OPERATIONS PERFORMED:  Right shoulder examination under anesthesia,  diagnostic arthroscopy, arthroscopic extensive debridement of  rotator cuff and lesser tuberosity exostosis, arthroscopic removal  of retained sutures, arthroscopic lysis of adhesions, arthroscopic  revision subacromial decompression, open subpectoral biceps  tenodesis. ANESTHESIA:  General anesthesia, interscalene block. IV FLUIDS:  1100 mL of crystalloids. ESTIMATED BLOOD LOSS:  50 mL. COMPLICATIONS:  None. SURGEON:  Jovana Tam MD    ASSISTANT:  Augusto Davis DO    IMPLANT:  [employer] tenodesis screw 5.5 mm x 15 mm x1. FINDINGS:  Examination under anesthesia revealed no focal motion  deficits. Diagnostic arthroscopy revealed a very macerated and  inflamed biceps tendon extensive tearing. Rotator cuff looked  pristine and no significant glenohumeral arthritis. There was no  surgical change or retained sutures anteriorly. There were  standard post-capsulorrhaphy changes with obliteration of the  rotator interval. Exploration of subacromial space revealed dense  subdeltoid and subacromial adhesions, type 2 acromion.   Despite  prior acromioplasty, there is still some residual irregularity  amenable to revision, decompression. No rotator cuff tear. The bicipital groove had extensive tenosynovitis with biceps  extending down past the pectoralis. This forced us to do an open  subpectoral biceps tenodesis. After doing this, we went back into  the joint, we could see that there was still an excrescence of the  lesser tuberosity. This bony excrescence could be removed without  damaging the subscapularis attachment. BRIEF HISTORY AND PRESENTING ILLNESS:  The patient is a 69-year-old  woman who underwent open capsulorrhaphy of the right shoulder many  years ago. She had seen me once or twice before and we managed her  problems conservatively. However, she was having worsening  anterior shoulder pain that failed multimodal conservative  treatment. X-ray showed an exostosis suggesting some biceps  instability, and the MRI showed biceps disease without any obvious  full-thickness rotator cuff tear. We recommended revision surgery  in the form of biceps tenodesis. She understood we would do a  current decompression, release of adhesions, and inspect rotator  cuff. She understood concurrent lesions would be addressed as  encountered. We see on x-ray and MRI excrescence. We felt that  this might cause mechanical symptoms. We plan to remove this as  well. She understood the risks such as bleeding, infection,  anesthetic risks, injury to blood vessels, stiffness, weakness,  incomplete pain relief, and need for further surgery. She  understood all of this and wished to proceed. She gave her  informed consent preoperatively. OPERATIVE PROCEDURE:  On the date of the procedure, brought back to  the operating room, placed supine on the operating table. General  anesthesia was established. Preoperative antibiotics and  interscalene block were given in the holding area. Under  anesthesia, exam was carried out with the findings as above.   The  patient was then positioned using a Tenet beach-chair position in a  sitting position. All pressure points were padded. The patient's  right shoulder and arm were prepped and draped in the standard  manner for arthroscopic shoulder surgery. We used Tenet Spider arm  gallegos to hold the arm in position. We began diagnostic  arthroscopy. The arthroscope was introduced into the glenohumeral  joint through a standard posterolateral portal.  A systematic  diagnostic arthroscopy was ensued with findings as noted above. We  established an anterior portal over the rotator interval.  We  inserted our arthroscopic shaver across the metal cannula. This  was used to divide some sutures and some scar tissue. We debrided  some labral fraying. We inspected the biceps tendon. We debrided  it and it was extensively torn, so we elected to proceed with  tenotomy as a first step for tenodesis. We percutaneously placed  an 18-gauge spinal needle through the biceps tendon and passed a #1  PDS suture through the needle, withdrew the needle, and retrieved  the suture anteriorly. More medial to that, we used an upbiting  basket, tenotomized the biceps tendon right off the supraglenoid  tubercle. We debrided the biceps stump using a shaver. We then  redirected the arthroscope through the same posterior portal above  the rotator cuff into the subacromial space. We established a  lateral portal under direct visualization and dilated the portal  with arthroscopic shaver and performed a thorough bursectomy. There were a quite of bit of bursal adhesions, these were resected. We placed our arthroscope laterally and completed the bursectomy  posterior and posterolateral using a shaver and a cautery device. Cautery was used to resect the periosteum from the undersurface of  the acromion and gently teased off the coracoacromial ligament. This exposed evidence of prior acromioplasty. There was still some  irregularity laterally and anteriorly.   This was drilled a guidewire through the  bicipital groove and then reamed over guidewire with a 6 reamer. We reamed the anterior cortex only. We removed the guidewire. We  irrigated copiously. We then took the interference screw and  delivered the tendon along with the interference screw in the bone  window. We secured the tendon with the interference screw until  the interference screw flushed the anterior cortical surface. It  was stable to TUG testing. We tied a pair of sutures within the  interference screw to the pair of sutures in the tendon. This  reinforced the repair in a pants-over-vest type fashion. The tails  were cut short. We irrigated. We then went back into the joint  with the arthroscope in the lateral protal.  We went into the  subacromial space and saw the bicipital groove. We could see the  exostosis. We unroofed a little bit of soft tissue and then  brought the alda from anterolateral and debrided it. We also  debrided lateral wall of the bicipital groove which was quite  thick, again because of how unstable the biceps tendon was. We  smoothed all of this, irrigated copiously, and removed the  arthroscopic instruments. We closed the biceps wound using 3-0  Vicryl in interrupted inverted fashion followed by 4-0 Monocryl  closure and Prineo dressing. We infiltrated with Marcaine for  analgesia. We closed the arthroscopic portals using 4-0 Monocryl  closure and Steri-Strips including anterolateral and posterior as  well as accessory anterolateral portal.  Sterile compressive  dressing was applied. The arm was placed in padded soft brace. The patient was repositioned in supine position, promptly awakened  from anesthesia, having tolerated the procedure well, taken from  the operating room to the recovery room in satisfactory condition.     PLAN:  The patient will be discharged on oral analgesics with  instructions to begin outpatient physical therapy and follow up  with me in the office in one week.         Ting Kelsey MD    D: 06/10/2019 14:27:47       T: 06/10/2019 23:44:40      [email or web address]  Job#: [id number]     Doc#: [id number]    CC:

## 2019-06-12 ENCOUNTER — TELEPHONE (OUTPATIENT)
Dept: ORTHOPEDIC SURGERY | Age: 54
End: 2019-06-12

## 2019-06-12 NOTE — TELEPHONE ENCOUNTER
Please call patient regarding physical therapy order for [facility] location.

## 2019-06-13 ENCOUNTER — HOSPITAL ENCOUNTER (OUTPATIENT)
Dept: PHYSICAL THERAPY | Age: 54
Setting detail: THERAPIES SERIES
Discharge: HOME | End: 2019-06-13
Payer: COMMERCIAL

## 2019-06-13 PROCEDURE — 97161 PT EVAL LOW COMPLEX 20 MIN: CPT | Performed by: PHYSICAL THERAPIST

## 2019-06-13 PROCEDURE — 97110 THERAPEUTIC EXERCISES: CPT | Performed by: PHYSICAL THERAPIST

## 2019-06-13 NOTE — PLAN OF CARE
The [address]  Phone [phone]  Fax [phone]      Physical Therapy Certification    Dear Referring Practitioner: Kusum Wolff,    We had the pleasure of evaluating the following patient for physical therapy services at [address] [place] [address]. A summary of our findings can be found in the initial assessment below. This includes our plan of care. If you have any questions or concerns regarding these findings, please do not hesitate to contact me at the office phone number checked above.   Thank you for the referral.       Physician Signature:_______________________________Date:__________________  By signing above (or electronic signature), therapists plan is approved by physician      Patient: Catracho Wilkins   : 1965   MRN: [id number]  Referring Physician: Referring Practitioner: Kusum Wolff      Evaluation Date: 2019      Medical Diagnosis Information:  Diagnosis: biceps tear, impingement m75.41   Treatment Diagnosis: m25.511                                         Insurance information: PT Insurance Information: medical mutual     Precautions/ Contra-indications: no active biceps x 6 weeks  Latex Allergy:  [x]NO      []YES  Preferred Language for Healthcare:   [x]English       []other:    SUBJECTIVE: Patient stated complaint:MEGHANA pain since March , no MEGHANA    Relevant Medical History:B TKA  Functional Disability Index:UEFS 100%    Pain Scale: 0-5/10  Easing factors: percocet, ice  Provocative factors: ROM     Type: [x]Constant   []Intermittent  []Radiating [x]Localized []other:     Numbness/Tingling: none    Occupation/School: RN [employer]    Living Status/Prior Level of Function: Independent with ADLs and IADLs, RTW,     OBJECTIVE:     ROM PROM AROM  Comment    L R L R    Flexion  ~90 6/13      Abduction        ER        IR        Other  (cervical)        Other Strength not tested due to recent surgery L R Comment   Flexion      Abduction      ER      IR      Supraspinatus      Upper Trap      Lower Trap      Mid Trap      Rhomboids      Biceps      Triceps      Horizontal Abduction      Horizontal Adduction      Lats            Reflexes/Sensation:               [x]Dermatomes/Myotomes intact 6/13              []Reflexes equal and normal bilaterally               []Other:     Joint mobility: not assessed 6/13              []Normal               []Hypo              []Hyper     Palpation: general TTP due to recent surgery 6/13     Functional Mobility/Transfers: I with R UE posturing 6/13     Posture: WFL for 62 yr old female, ultra sling R shoulder  6/13     Bandages/Dressings/Incisions: removed in clinic, CDI , moderate effusion due to recent surgery 6/13     Gait: (include devices/WB status): WNL  R ultra sling in place 6/13                         [x] Patient history, allergies, meds reviewed. Medical chart reviewed. See intake form. 6/13     Review Of Systems (ROS):  [x]Performed Review of systems (Integumentary, CardioPulmonary, Neurological) by intake and observation. Intake form has been scanned into medical record. Patient has been instructed to contact their primary care physician regarding ROS issues if not already being addressed at this time.   6/13     Co-morbidities/Complexities (which will affect course of rehabilitation):   [x]None              Arthritic conditions   []Rheumatoid arthritis (M05.9)  []Osteoarthritis (M19.91)    Cardiovascular conditions   []Hypertension (I10)  []Hyperlipidemia (E78.5)  []Angina pectoris (I20)  []Atherosclerosis (I70)    Musculoskeletal conditions   []Disc pathology   []Congenital spine pathologies   []Prior surgical intervention  []Osteoporosis (M81.8)  []Osteopenia (M85.8)   Endocrine conditions   []Hypothyroid (E03.9)  []Hyperthyroid Gastrointestinal conditions   []Constipation (N62.66)    Metabolic conditions   []Morbid obesity (E66.01)  []Diabetes type 1(E10.65) or 2 (E11.65)   []Neuropathy (G60.9)      Pulmonary conditions   []Asthma (J45)  []Coughing   []COPD (J44.9)    Psychological Disorders  []Anxiety (F41.9)  []Depression (F32.9)   []Other:    []Other:            Barriers to/and or personal factors that will affect rehab potential:              [x]Age  [x]Sex              [x]Motivation/Lack of Motivation                        []Co-Morbidities              []Cognitive Function, education/learning barriers              []Environmental, home barriers              [x]profession/work barriers  [x]past PT/medical experience  []other:       Falls Risk Assessment (30 days):   [x] Falls Risk assessed and no intervention required.   [] Falls Risk assessed and Patient requires intervention due to being higher risk   TUG score (>12s at risk):     [] Falls education provided, including      G-Codes:          ASSESSMENT:   Functional Impairments              []Noted spinal or UE joint hypomobility              []Noted spinal or UE joint hypermobility              [x]Decreased UE functional ROM              [x]Decreased UE functional strength              []Abnormal reflexes/sensation/myotomal/dermatomal deficits              [x]Decreased RC/scapular/core strength and neuromuscular control              []other:       Functional Activity Limitations (from functional questionnaire and intake)              [x]Reduced ability to tolerate prolonged functional positions              [x]Reduced ability or difficulty with changes of positions or transfers between positions              [x]Reduced ability to maintain good posture and demonstrate good body mechanics with sitting, bending, and lifting              [x] Reduced ability or tolerance with driving and/or computer work              [x]Reduced ability to sleep              [x]Reduced ability to perform lifting, reaching, carrying tasks              [x]Reduced ability to tolerate impact through UE              [x]Reduced ability to reach behind back              [x]Reduced ability to  or hold objects              [x]Reduced ability to throw or toss an object              []other:       Participation Restrictions              [x]Reduced participation in self care activities              [x]Reduced participation in home management activities              [x]Reduced participation in work activities              [x]Reduced participation in social activities. [x]Reduced participation in sport / recreational activities. Classification:              [x]Signs/symptoms consistent with post-surgical status including decreased ROM, strength and function.   []Signs/symptoms consistent with joint sprain/strain              []Signs/symptoms consistent with shoulder impingement              []Signs/symptoms consistent with shoulder/elbow/wrist tendinopathy              []Signs/symptoms consistent with Rotator cuff tear              []Signs/symptoms consistent with labral tear              []Signs/symptoms consistent with postural dysfunction                         []Signs/symptoms consistent with Glenohumeral IR Deficit - <45 degrees              []Signs/symptoms consistent with facet dysfunction of cervical/thoracic spine                         []Signs/symptoms consistent with pathology which may benefit from Dry needling                []other:      Prognosis/Rehab Potential:                                       []Excellent              [x]Good                 []Fair              []Poor     Tolerance of evaluation/treatment:               []Excellent              [x]Good                 []Fair              []Poor     Physical Therapy Evaluation Complexity Justification  [x] A history of present problem with:  [] no personal factors and/or comorbidities that impact the plan of care;  [x]1-2 personal factors and/or comorbidities that impact the plan of care  []3 personal factors and/or comorbidities that impact the plan of care  [x] An examination of body systems using standardized tests and measures addressing any of the following: body structures and functions (impairments), activity limitations, and/or participation restrictions;:  [] a total of 1-2 or more elements   [x] a total of 3 or more elements   [] a total of 4 or more elements   [x] A clinical presentation with:  [x] stable and/or uncomplicated characteristics   [] evolving clinical presentation with changing characteristics  [] unstable and unpredictable characteristics;   [x] Clinical decision making of [x] low, [] moderate, [] high complexity using standardized patient assessment instrument and/or measurable assessment of functional outcome. [x] EVAL (LOW) 64619 (typically 20 minutes face-to-face)  [] EVAL (MOD) 93831 (typically 30 minutes face-to-face)  [] EVAL (HIGH) 86893 (typically 45 minutes face-to-face)  [] RE-EVAL         PLAN:  Frequency/Duration:  1-2 days per week for 4 Weeks:6/13-7/13  INTERVENTIONS:  [x] Therapeutic exercise including: strength training, ROM, for Upper extremity and core   [x]  NMR activation and proprioception for UE, scap and Core   [x] Manual therapy as indicated for shoulder, scapula and spine to include: Dry Needling/IASTM, STM, PROM, Gr I-IV mobilizations, manipulation. [x] Modalities as needed that may include: thermal agents, E-stim, Biofeedback, US, iontophoresis as indicated  [x] Patient education on joint protection, postural re-education, activity modification, progression of HEP. GOALS:  Patient stated goal: RTW, adls without issues    Therapist goals for Patient:   Short Term Goals: To be achieved in: 2 weeks  1. Independent in HEP and progression per patient tolerance, in order to prevent re-injury. 2. Patient will have a decrease in pain to facilitate improvement in movement, function, and ADLs as indicated by Functional Deficits. Long Term Goals:  To be achieved in: 12 weeks  1. Disability index score of 15% or less for the UEFS to assist with reaching prior level of function. 2. Patient will demonstrate increased AROM to [facility] to allow for proper joint functioning as indicated by patients Functional Deficits. 3. Patient will demonstrate an increase in Strength to good scapular and core control  for UE to allow for proper functional mobility as indicated by patients Functional Deficits. 4. Patient will return to functional activities without increased symptoms or restriction. light adls 6 weeks, heavy adls 12 weeks        Electronically signed by:  Yeimy Ibarra, PT

## 2019-06-13 NOTE — FLOWSHEET NOTE
[address], [address] [place] [address]  Phone: [phone]   Fax:     [phone]    Physical Therapy Daily Treatment Note  Date:  2019    Patient Name:  Albert Christiansen    :  1965  MRN: [id number]  Restrictions/Precautions:    Medical/Treatment Diagnosis Information:  Diagnosis: biceps tear, impingement m75.41  Treatment Diagnosis: m25.511   PROVIDER:     Kayleigh May MD  DATE OF PROCEDURE:  06/10/2019  PREOPERATIVE DIAGNOSES:  Right shoulder impingement, postsurgical  adhesions, biceps tendinopathy, possible rotator cuff tear.     POSTOPERATIVE DIAGNOSIS:  Right shoulder impingement, postsurgical  adhesions, biceps tendinopathy, no rotator cuff tear, no  glenohumeral arthritis, and bony excrescence of the lesser  tuberosity.     OPERATIONS PERFORMED:  Right shoulder examination under anesthesia,  diagnostic arthroscopy, arthroscopic extensive debridement of  rotator cuff and lesser tuberosity exostosis, arthroscopic removal  of retained sutures, arthroscopic lysis of adhesions, arthroscopic  revision subacromial decompression, open subpectoral biceps  tenodesis.       Insurance/Certification information:  PT Insurance Information: medical mutual   Physician Information:  Referring Practitioner: Kayleigh May  Plan of care signed (Y/N):     Date of Patient follow up with Physician:     Edgardo Ramirez 100% disability        Progress Note: []  Yes  []  No  Next due by: Visit #10      Latex Allergy:  [x]NO      []YES  Preferred Language for Healthcare:   [x]English       []other:    Visit # Insurance Allowable   1 40 per year med mutual     Pain level:  0-4/10     SUBJECTIVE:   See eval    OBJECTIVE:              ROM PROM AROM  Comment     L R L R     Flexion   ~90          Abduction             ER             IR             Other  (cervical)             Other                    Strength not tested due to recent surgery L R Comment   Flexion         Abduction         ER         IR         Supraspinatus         Upper Trap         Lower Trap         Mid Trap         Rhomboids         Biceps         Triceps         Horizontal Abduction         Horizontal Adduction         Lats                  Reflexes/Sensation:               [x]Dermatomes/Myotomes intact 6/13              []Reflexes equal and normal bilaterally               []Other:     Joint mobility: not assessed 6/13              []Normal               []Hypo              []Hyper     Palpation: general TTP due to recent surgery 6/13     Functional Mobility/Transfers: I with R UE posturing 6/13     Posture: WFL for 62 yr old female, ultra sling R shoulder  6/13     Bandages/Dressings/Incisions: removed in clinic, CDI , moderate effusion due to recent surgery 6/13     Gait: (include devices/WB status): WNL  R ultra sling in place 6/13        [x] Patient history, allergies, meds reviewed. Medical chart reviewed. See intake form. 6/13     Review Of Systems (ROS):  [x]Performed Review of systems (Integumentary, CardioPulmonary, Neurological) by intake and observation. Intake form has been scanned into medical record.  Patient has been instructed to contact their primary care physician regarding ROS issues if not already being addressed at this time.  6/13         RESTRICTIONS/PRECAUTIONS: no active biceps x 6 weeks    Exercises/Interventions:   Exercises:  Exercise/Equipment Resistance/Repetitions Other comments   Stretching/PROM     Elbow PROM [id number]    Table Slides Fl 10x 10sec    UE [place]     Pulleys     Pendulum          Isometrics     Retraction 3x10     hourly 20x    Weight shift     Flexion     Abduction     External Rotation     Internal Rotation     Biceps     Triceps          PRE's     Flexion     Abduction     External Rotation     Internal Rotation     Shrugs 3x10    EXT     Reverse Flys     Serratus     Horizontal Abd with ER     Biceps     Triceps Retraction          Cable Column/Theraband     External Rotation     Internal Rotation     Shrugs     Lats     Ext     Flex     Scapular Retraction     BIC     TRIC     PNF          Dynamic Stability          Plyoback          Manual interventions                     Therapeutic Exercise and NMR EXR  [x] (78564) Provided verbal/tactile cueing for activities related to strengthening, flexibility, endurance, ROM  for improvements in scapular, scapulothoracic and UE control with self care, reaching, carrying, lifting, house/yardwork, driving/computer work.    [] (86415) Provided verbal/tactile cueing for activities related to improving balance, coordination, kinesthetic sense, posture, motor skill, proprioception  to assist with  scapular, scapulothoracic and UE control with self care, reaching, carrying, lifting, house/yardwork, driving/computer work. Therapeutic Activities:    [] (31406 or 68742) Provided verbal/tactile cueing for activities related to improving balance, coordination, kinesthetic sense, posture, motor skill, proprioception and motor activation to allow for proper function of scapular, scapulothoracic and UE control with self care, carrying, lifting, driving/computer work.      Home Exercise Program:    [x] (93030) Reviewed/Progressed HEP activities related to strengthening, flexibility, endurance, ROM of scapular, scapulothoracic and UE control with self care, reaching, carrying, lifting, house/yardwork, driving/computer work  [] (54908) Reviewed/Progressed HEP activities related to improving balance, coordination, kinesthetic sense, posture, motor skill, proprioception of scapular, scapulothoracic and UE control with self care, reaching, carrying, lifting, house/yardwork, driving/computer work      Manual Treatments:  PROM / STM / Oscillations-Mobs:  G-I, II, III, IV (PA's, Inf., Post.)  [] (60965) Provided manual therapy to mobilize soft tissue/joints of cervical/CT, scapular GHJ and UE for the purpose of modulating pain, promoting relaxation,  increasing ROM, reducing/eliminating soft tissue swelling/inflammation/restriction, improving soft tissue extensibility and allowing for proper ROM for normal function with self care, reaching, carrying, lifting, house/yardwork, driving/computer work    Modalities:  Ice 15 min    Charges:  Timed Code Treatment Minutes: 20   Total Treatment Minutes: 60       [x] EVAL (LOW) 27637 (typically 20 minutes face-to-face)  [] EVAL (MOD) 61029 (typically 30 minutes face-to-face)  [] EVAL (HIGH) 40886 (typically 45 minutes face-to-face)  [] RE-EVAL     [x] YC(94401) x  1   [] IONTO  [] NMR (52134) x      [] VASO  [] Manual (57231) x       [] Other:  [] TA x       [] Mech Traction (23408)  [] ES(attended) (39032)      [] ES (un) (73498):     GOALS:  Patient stated goal: RTW, adls without issues     Therapist goals for Patient:   Short Term Goals: To be achieved in: 2 weeks  1. Independent in HEP and progression per patient tolerance, in order to prevent re-injury. 2. Patient will have a decrease in pain to facilitate improvement in movement, function, and ADLs as indicated by Functional Deficits.     Long Term Goals: To be achieved in: 12 weeks  1. Disability index score of 15% or less for the UEFS to assist with reaching prior level of function. 2. Patient will demonstrate increased AROM to [facility] to allow for proper joint functioning as indicated by patients Functional Deficits. 3. Patient will demonstrate an increase in Strength to good scapular and core control  for UE to allow for proper functional mobility as indicated by patients Functional Deficits. 4. Patient will return to functional activities without increased symptoms or restriction. light adls 6 weeks, heavy adls 12 weeks       Progression Towards Functional goals:  [] Patient is progressing as expected towards functional goals listed. [] Progression is slowed due to complexities listed.   [] Progression has been slowed due to co-morbidities. [x] Plan just implemented, too soon to assess goals progression  [] Other:     ASSESSMENT:  See eval    Treatment/Activity Tolerance:  [x] Patient tolerated treatment well [] Patient limited by fatique  [] Patient limited by pain  [] Patient limited by other medical complications  [] Other:     Patient education: 6/13 Reviewed diagnosis, POC, HEP and its importance. Prognosis: [x] Good [] Fair  [] Poor    Patient Requires Follow-up: [x] Yes  [] No    PLAN: 1-2 days per week for 4 Weeks:6/13-7/13  [] Continue per plan of care [] Alter current plan (see comments)  [x] Plan of care initiated [] Hold pending MD visit [] Discharge    Electronically signed by: Marie Arias PT,   *If patient does not return for further follow ups after this date. Please consider this as the patients discharge from physical therapy.

## 2019-06-17 ENCOUNTER — OFFICE VISIT (OUTPATIENT)
Dept: ORTHOPEDIC SURGERY | Age: 54
End: 2019-06-17

## 2019-06-17 VITALS
HEART RATE: 68 BPM | HEIGHT: 69 IN | DIASTOLIC BLOOD PRESSURE: 70 MMHG | BODY MASS INDEX: 26.64 KG/M2 | WEIGHT: 179.9 LBS | SYSTOLIC BLOOD PRESSURE: 111 MMHG

## 2019-06-17 DIAGNOSIS — Z98.890 S/P ARTHROSCOPY OF RIGHT SHOULDER: Primary | ICD-10-CM

## 2019-06-17 PROCEDURE — 99024 POSTOP FOLLOW-UP VISIT: CPT | Performed by: PHYSICIAN ASSISTANT

## 2019-06-18 ENCOUNTER — HOSPITAL ENCOUNTER (OUTPATIENT)
Dept: PHYSICAL THERAPY | Age: 54
Setting detail: THERAPIES SERIES
Discharge: HOME | End: 2019-06-18
Payer: COMMERCIAL

## 2019-06-18 PROCEDURE — 97112 NEUROMUSCULAR REEDUCATION: CPT | Performed by: PHYSICAL THERAPIST

## 2019-06-18 PROCEDURE — 97110 THERAPEUTIC EXERCISES: CPT | Performed by: PHYSICAL THERAPIST

## 2019-06-18 NOTE — FLOWSHEET NOTE
The [address] [place] [address],[address], [address]  Phone: [phone]   Fax:     [phone]    Physical Therapy Daily Treatment Note  Date:  2019    Patient Name:  Daniel Story    :  1965  MRN: [id number]  Restrictions/Precautions:    Medical/Treatment Diagnosis Information:  Diagnosis: biceps tear, impingement m75.41  Treatment Diagnosis: m25.511   PROVIDER:     Robel Guidry MD  DATE OF PROCEDURE:  06/10/2019  PREOPERATIVE DIAGNOSES:  Right shoulder impingement, postsurgical  adhesions, biceps tendinopathy, possible rotator cuff tear.     POSTOPERATIVE DIAGNOSIS:  Right shoulder impingement, postsurgical  adhesions, biceps tendinopathy, no rotator cuff tear, no  glenohumeral arthritis, and bony excrescence of the lesser  tuberosity.     OPERATIONS PERFORMED:  Right shoulder examination under anesthesia,  diagnostic arthroscopy, arthroscopic extensive debridement of  rotator cuff and lesser tuberosity exostosis, arthroscopic removal  of retained sutures, arthroscopic lysis of adhesions, arthroscopic  revision subacromial decompression, open subpectoral biceps  tenodesis. 6/10/19      Insurance/Certification information:  PT Insurance Information: medical mutual   Physician Information:  Referring Practitioner: Robel Guidry  Plan of care signed (Y/N):     Date of Patient follow up with Physician:     Rosario Huston 100% disability        Progress Note: []  Yes  []  No  Next due by: Visit #10      Latex Allergy:  [x]NO      []YES  Preferred Language for Healthcare:   [x]English       []other:    Visit # Insurance Allowable   2 40 per year med mutual     Pain level:  0-4/10     SUBJECTIVE:   Reports that her shoulder is doing really well. States that pain has been minimal.  Compliant with sling wear and HEP.     OBJECTIVE:              ROM PROM AROM  Comment     L R L R     Flexion   ~120          Abduction    appr 120 Oscillations-Mobs:  G-I, II, III, IV (PA's, Inf., Post.)  [] (43623) Provided manual therapy to mobilize soft tissue/joints of cervical/CT, scapular GHJ and UE for the purpose of modulating pain, promoting relaxation,  increasing ROM, reducing/eliminating soft tissue swelling/inflammation/restriction, improving soft tissue extensibility and allowing for proper ROM for normal function with self care, reaching, carrying, lifting, house/yardwork, driving/computer work    Modalities:  Ice 15 min    Charges:  Timed Code Treatment Minutes: 40'   Total Treatment Minutes: 2:00-2:59  61'       [] EVAL (LOW) 61495 (typically 20 minutes face-to-face)  [] EVAL (MOD) 86842 (typically 30 minutes face-to-face)  [] EVAL (HIGH) 56925 (typically 45 minutes face-to-face)  [] RE-EVAL     [x] SM(83453) x  2   [] IONTO  [x] NMR (09708) x  1   [] VASO  [] Manual (04495) x       [] Other:  [] TA x       [] Mech Traction (67375)  [] ES(attended) (52515)      [] ES (un) (57643):     GOALS:  Patient stated goal: RTW, adls without issues     Therapist goals for Patient:   Short Term Goals: To be achieved in: 2 weeks  1. Independent in HEP and progression per patient tolerance, in order to prevent re-injury. 2. Patient will have a decrease in pain to facilitate improvement in movement, function, and ADLs as indicated by Functional Deficits.     Long Term Goals: To be achieved in: 12 weeks  1. Disability index score of 15% or less for the UEFS to assist with reaching prior level of function. 2. Patient will demonstrate increased AROM to [facility] to allow for proper joint functioning as indicated by patients Functional Deficits. 3. Patient will demonstrate an increase in Strength to good scapular and core control  for UE to allow for proper functional mobility as indicated by patients Functional Deficits. 4. Patient will return to functional activities without increased symptoms or restriction. light adls 6 weeks, heavy adls 12 weeks

## 2019-06-19 NOTE — PROGRESS NOTES
History of Present Illness:  Albert Christiansen is a 62 y.o. female who presents for a post operative visit. The patient underwent a right shoulder arthroscopy for open subpectoral biceps tenodesis with extensive debridement and lysis of adhesion. Surgery was on 6/10/2019 by Dr. Kayleigh May. Overall she is doing okay and feels that their pain is well controlled with current pain medications. She has been compliant with wearing the UltraSling brace at all times. The patient deny fevers, chills, numbness, tingling, and shortness of breath. Medical History:  Patient's medications, allergies, past medical, surgical, social and family histories were reviewed and updated as appropriate. Review of Systems    DONE:04/03/19  No change    Review of Systems  A 14 point review of systems was completed by the patient on 4/3/19 and is available in the media section of the scanned medical record and was reviewed on 6/19/2019. Vital Signs:  Vitals:    06/17/19 0936   BP: 111/70   Pulse: 68       General/Appearance: Alert and oriented and in no apparent distress. Skin:  There are no skin lesions, cellulitis, or extreme edema. The patient has warm and well-perfused Bilateral upper extremities with brisk capillary refill. Right shoulder Exam:    Inspection: Right shoulder incision that is clean, dry and intact and well approximated. The Prineo dressing is still in place. Mild ecchymosis and swelling are present as can be expected. There is no erythema, drainage or other signs of infection    Palpation:  No crepitus to gentle motion    Active Range of Motion: Deferred    Passive Range of Motion: Able to tolerate gentle pendulum motions and forward elevation of 40 degrees and external rotation 5 degrees    Strength:  Deferred    Special Tests:  Deferred.     Neurovascular: Sensation to light touch is intact, no motor deficits, palpable radial pulses 2+        Assessment :  Ms. Albert Christiansen is a 62 y.o. yr old patient who underwent a right shoulder arthroscopy for open subpectoral biceps tenodesis and extensive debridement      Impression:  Encounter Diagnosis   Name Primary?  S/P arthroscopy of right shoulder Yes       Office Procedures:  No orders of the defined types were placed in this encounter. Treatment Plan:    Overall the patient is doing well. The pain is well-controlled. She was told that she may go ahead and discontinue wearing her sling when she is inside of her home however she would need to continue wearing it outside in a crowd for an additional 1 to 2 weeks. As long as she is off of her pain medication she may resume driving without her sling. She was asked to start physical therapy twice a week. We will place orders in her chart. All of her questions were fully answered today. We would like to see her back in 2 weeks for follow-up visit. 11:14 AM      Mike Hull PA-C  Orthopaedic Sports Medicine Physician Assistant    This dictation was performed with a verbal recognition program [facility]) and it was checked for errors. It is possible that there are still dictated errors within this office note. If so, please bring any errors to my attention for an addendum. All efforts were made to ensure that this office note is accurate.

## 2019-06-20 ENCOUNTER — HOSPITAL ENCOUNTER (OUTPATIENT)
Dept: PHYSICAL THERAPY | Age: 54
Setting detail: THERAPIES SERIES
Discharge: HOME | End: 2019-06-20
Payer: COMMERCIAL

## 2019-06-20 PROCEDURE — 97112 NEUROMUSCULAR REEDUCATION: CPT | Performed by: PHYSICAL THERAPIST

## 2019-06-20 PROCEDURE — 97110 THERAPEUTIC EXERCISES: CPT | Performed by: PHYSICAL THERAPIST

## 2019-06-20 NOTE — FLOWSHEET NOTE
The [address] [place] [address],[address], [address]  Phone: [phone]   Fax:     [phone]    Physical Therapy Daily Treatment Note  Date:  2019    Patient Name:  Marcy Valadez    :  1965  MRN: [id number]  Restrictions/Precautions:    Medical/Treatment Diagnosis Information:  Diagnosis: biceps tear, impingement m75.41  Treatment Diagnosis: m25.511   PROVIDER:     Gabe Vallejo MD  DATE OF PROCEDURE:  06/10/2019  PREOPERATIVE DIAGNOSES:  Right shoulder impingement, postsurgical  adhesions, biceps tendinopathy, possible rotator cuff tear.     POSTOPERATIVE DIAGNOSIS:  Right shoulder impingement, postsurgical  adhesions, biceps tendinopathy, no rotator cuff tear, no  glenohumeral arthritis, and bony excrescence of the lesser  tuberosity.     OPERATIONS PERFORMED:  Right shoulder examination under anesthesia,  diagnostic arthroscopy, arthroscopic extensive debridement of  rotator cuff and lesser tuberosity exostosis, arthroscopic removal  of retained sutures, arthroscopic lysis of adhesions, arthroscopic  revision subacromial decompression, open subpectoral biceps  tenodesis. 6/10/19      Insurance/Certification information:  PT Insurance Information: medical mutual   Physician Information:  Referring Practitioner: Gabe Vallejo  Plan of care signed (Y/N):     Date of Patient follow up with Physician:     Mikki Roth 100% disability        Progress Note: []  Yes  []  No  Next due by: Visit #10      Latex Allergy:  [x]NO      []YES  Preferred Language for Healthcare:   [x]English       []other:    Visit # Insurance Allowable   3 40 per year med mutual     Pain level:  0-4/10     SUBJECTIVE:   Reports that her shoulder is doing really well.   States that pain has been minimal.  Compliant with sling wear and HEP    OBJECTIVE:              ROM PROM AROM  Comment     L R L R     Flexion   ~120          Abduction    appr 120 6/18         ER             IR             Other  (cervical)             Other                    Strength not tested due to recent surgery L R Comment   Flexion         Abduction         ER         IR         Supraspinatus         Upper Trap         Lower Trap         Mid Trap         Rhomboids         Biceps         Triceps         Horizontal Abduction         Horizontal Adduction         Lats                  Reflexes/Sensation:               [x]Dermatomes/Myotomes intact 6/13              []Reflexes equal and normal bilaterally               []Other:     Joint mobility: not assessed 6/13              []Normal               []Hypo              []Hyper     Palpation: general TTP due to recent surgery 6/13     Functional Mobility/Transfers: I with R UE posturing 6/13     Posture: WFL for 62 yr old female, ultra sling R shoulder  6/13     Bandages/Dressings/Incisions: removed in clinic, CDI , moderate effusion due to recent surgery 6/13     Gait: (include devices/WB status): WNL  R ultra sling in place 6/13        [x] Patient history, allergies, meds reviewed. Medical chart reviewed. See intake form. 6/13     Review Of Systems (ROS):  [x]Performed Review of systems (Integumentary, CardioPulmonary, Neurological) by intake and observation. Intake form has been scanned into medical record.  Patient has been instructed to contact their primary care physician regarding ROS issues if not already being addressed at this time.  6/13         RESTRICTIONS/PRECAUTIONS: no active biceps x 6 weeks    Exercises/Interventions:   Exercises:  Exercise/Equipment Resistance/Repetitions Other comments   Stretching/PROM     Elbow PROM 20x    Table Slides Flex/scap 10x 10sec scap added 6/18   [email or web address] 10x10\" Added 6/18   Pulleys flex 10x10\" Added 6/20             Isometrics     Retraction 3x10     3'    Weight shift     Flexion     Abduction 10x10\" Added 6/18   External Rotation     Internal Rotation     Biceps     Triceps PRE's     Flexion     Abduction     External Rotation     Internal Rotation     Shrugs 3x10    EXT     Reverse Flys     Serratus     Horizontal Abd with ER     Biceps     Triceps     Retraction          Cable Column/Theraband     External Rotation     Internal Rotation     Shrugs     Lats     Ext     Flex     Scapular Retraction     BIC     TRIC     PNF          Dynamic Stability          Plyoback          Manual interventions                     Therapeutic Exercise and NMR EXR  [x] (68339) Provided verbal/tactile cueing for activities related to strengthening, flexibility, endurance, ROM  for improvements in scapular, scapulothoracic and UE control with self care, reaching, carrying, lifting, house/yardwork, driving/computer work.    [] (02502) Provided verbal/tactile cueing for activities related to improving balance, coordination, kinesthetic sense, posture, motor skill, proprioception  to assist with  scapular, scapulothoracic and UE control with self care, reaching, carrying, lifting, house/yardwork, driving/computer work. Therapeutic Activities:    [] (83388 or 70624) Provided verbal/tactile cueing for activities related to improving balance, coordination, kinesthetic sense, posture, motor skill, proprioception and motor activation to allow for proper function of scapular, scapulothoracic and UE control with self care, carrying, lifting, driving/computer work.      Home Exercise Program:    [x] (44715) Reviewed/Progressed HEP activities related to strengthening, flexibility, endurance, ROM of scapular, scapulothoracic and UE control with self care, reaching, carrying, lifting, house/yardwork, driving/computer work  [] (58166) Reviewed/Progressed HEP activities related to improving balance, coordination, kinesthetic sense, posture, motor skill, proprioception of scapular, scapulothoracic and UE control with self care, reaching, carrying, lifting, house/yardwork, driving/computer work      Manual Treatments:  PROM / STM / Oscillations-Mobs:  G-I, II, III, IV (PA's, Inf., Post.)  [] (53014) Provided manual therapy to mobilize soft tissue/joints of cervical/CT, scapular GHJ and UE for the purpose of modulating pain, promoting relaxation,  increasing ROM, reducing/eliminating soft tissue swelling/inflammation/restriction, improving soft tissue extensibility and allowing for proper ROM for normal function with self care, reaching, carrying, lifting, house/yardwork, driving/computer work    Modalities:  Ice 15 min    Charges:  Timed Code Treatment Minutes: 40'   Total Treatment Minutes: 2:28-3:30  58'       [] EVAL (LOW) 79993 (typically 20 minutes face-to-face)  [] EVAL (MOD) 54865 (typically 30 minutes face-to-face)  [] EVAL (HIGH) 65922 (typically 45 minutes face-to-face)  [] RE-EVAL     [x] ZC(23216) x  2   [] IONTO  [x] NMR (45138) x  1   [] VASO  [] Manual (82978) x       [] Other:  [] TA x       [] Mech Traction (39224)  [] ES(attended) (46771)      [] ES (un) (86279):     GOALS:  Patient stated goal: RTW, adls without issues     Therapist goals for Patient:   Short Term Goals: To be achieved in: 2 weeks  1. Independent in HEP and progression per patient tolerance, in order to prevent re-injury. 2. Patient will have a decrease in pain to facilitate improvement in movement, function, and ADLs as indicated by Functional Deficits.     Long Term Goals: To be achieved in: 12 weeks  1. Disability index score of 15% or less for the UEFS to assist with reaching prior level of function. 2. Patient will demonstrate increased AROM to [facility] to allow for proper joint functioning as indicated by patients Functional Deficits. 3. Patient will demonstrate an increase in Strength to good scapular and core control  for UE to allow for proper functional mobility as indicated by patients Functional Deficits. 4. Patient will return to functional activities without increased symptoms or restriction. light adls 6 weeks, heavy adls 12 weeks       Progression Towards Functional goals:  [x] Patient is progressing as expected towards functional goals listed. [] Progression is slowed due to complexities listed. [] Progression has been slowed due to co-morbidities. [] Plan just implemented, too soon to assess goals progression  [] Other:     ASSESSMENT:      Treatment/Activity Tolerance:  [x] Patient tolerated treatment well [] Patient limited by fatique  [] Patient limited by pain  [] Patient limited by other medical complications  [x] Other: 6/20 No complaints with today's program.  ROM is progressing well. Incision sites are healing well. Requires minimal cuing with current program.      Patient education: 6/13 Reviewed diagnosis, POC, HEP and its importance. Prognosis: [x] Good [] Fair  [] Poor    Patient Requires Follow-up: [x] Yes  [] No    PLAN: 1-2 days per week for 4 Weeks:6/13-7/13  [x] Continue per plan of care [] Alter current plan (see comments)  [] Plan of care initiated [] Hold pending MD visit [] Discharge    Progress per surgical precautions    Electronically signed by: Remedios Bucio PT      *If patient does not return for further follow ups after this date. Please consider this as the patients discharge from physical therapy.

## 2019-06-25 ENCOUNTER — HOSPITAL ENCOUNTER (OUTPATIENT)
Dept: PHYSICAL THERAPY | Age: 54
Setting detail: THERAPIES SERIES
Discharge: HOME | End: 2019-06-25
Payer: COMMERCIAL

## 2019-06-25 PROCEDURE — 97110 THERAPEUTIC EXERCISES: CPT | Performed by: PHYSICAL THERAPIST

## 2019-06-25 PROCEDURE — 97112 NEUROMUSCULAR REEDUCATION: CPT | Performed by: PHYSICAL THERAPIST

## 2019-06-25 NOTE — FLOWSHEET NOTE
The [address] [place] [address],[address], [address], [address] [place] [address]  Phone: [phone]   Fax:     [phone]    Physical Therapy Daily Treatment Note  Date:  2019    Patient Name:  Erich Gutierrez    :  1965  MRN: [id number]  Restrictions/Precautions:    Medical/Treatment Diagnosis Information:  Diagnosis: biceps tear, impingement m75.41  Treatment Diagnosis: m25.511   PROVIDER:     Brittany De MD  DATE OF PROCEDURE:  06/10/2019  PREOPERATIVE DIAGNOSES:  Right shoulder impingement, postsurgical  adhesions, biceps tendinopathy, possible rotator cuff tear.     POSTOPERATIVE DIAGNOSIS:  Right shoulder impingement, postsurgical  adhesions, biceps tendinopathy, no rotator cuff tear, no  glenohumeral arthritis, and bony excrescence of the lesser  tuberosity.     OPERATIONS PERFORMED:  Right shoulder examination under anesthesia,  diagnostic arthroscopy, arthroscopic extensive debridement of  rotator cuff and lesser tuberosity exostosis, arthroscopic removal  of retained sutures, arthroscopic lysis of adhesions, arthroscopic  revision subacromial decompression, open subpectoral biceps  tenodesis. 6/10/19      Insurance/Certification information:  PT Insurance Information: medical mutual   Physician Information:  Referring Practitioner: Brittany De  Plan of care signed (Y/N):     Date of Patient follow up with Physician:     Valery Arm 100% disability        Progress Note: []  Yes  []  No  Next due by: Visit #10      Latex Allergy:  [x]NO      []YES  Preferred Language for Healthcare:   [x]English       []other:    Visit # Insurance Allowable   4 40 per year med mutual     Pain level:  0-4/10     SUBJECTIVE:   Reports that her shoulder is doing really well.   States that pain has been minimal.  Compliant with sling wear and HEP    OBJECTIVE:              ROM PROM AROM  Comment     L R L R     Flexion   ~120          Abduction    appr 120 6/18         ER             IR             Other  (cervical)             Other                    Strength not tested due to recent surgery L R Comment   Flexion         Abduction         ER         IR         Supraspinatus         Upper Trap         Lower Trap         Mid Trap         Rhomboids         Biceps         Triceps         Horizontal Abduction         Horizontal Adduction         Lats                  Reflexes/Sensation:               [x]Dermatomes/Myotomes intact 6/13              []Reflexes equal and normal bilaterally               []Other:     Joint mobility: not assessed 6/13              []Normal               []Hypo              []Hyper     Palpation: general TTP due to recent surgery 6/13     Functional Mobility/Transfers: I with R UE posturing 6/13     Posture: WFL for 62 yr old female, ultra sling R shoulder  6/13     Bandages/Dressings/Incisions: removed in clinic, CDI , moderate effusion due to recent surgery 6/13     Gait: (include devices/WB status): WNL  R ultra sling in place 6/13        [x] Patient history, allergies, meds reviewed. Medical chart reviewed. See intake form. 6/13     Review Of Systems (ROS):  [x]Performed Review of systems (Integumentary, CardioPulmonary, Neurological) by intake and observation. Intake form has been scanned into medical record.  Patient has been instructed to contact their primary care physician regarding ROS issues if not already being addressed at this time.  6/13         RESTRICTIONS/PRECAUTIONS: no active biceps x 6 weeks    Exercises/Interventions:   Exercises:  Exercise/Equipment Resistance/Repetitions Other comments   Stretching/PROM     Elbow PROM 20x    Table Slides Flex/scap 10x 10sec scap added 6/18   UE [email or web address] 10x10\" Added 6/18   Pulleys flex 10x10\" Added 6/20             Isometrics     Retraction 3x10     3'    Weight shift     Flexion     Abduction 10x10\" Added 6/18   External Rotation     Internal Rotation     [place] table press 10x10\" Added 6/25   Biceps     Triceps          PRE's     Flexion     Abduction     External Rotation     Internal Rotation     Shrugs 2lbs 3x10 Increased 6/25   EXT     Reverse Flys     Serratus     Horizontal Abd with ER     Biceps     Triceps     Retraction          Cable Column/Theraband     External Rotation     Internal Rotation     Shrugs     Lats     Ext     Flex     Scapular Retraction     BIC     TRIC     PNF          Dynamic Stability          Plyoback          Manual interventions     PROM flex/abd/ER/IR 10' Added 6/25              Therapeutic Exercise and NMR EXR  [x] (16710) Provided verbal/tactile cueing for activities related to strengthening, flexibility, endurance, ROM  for improvements in scapular, scapulothoracic and UE control with self care, reaching, carrying, lifting, house/yardwork, driving/computer work.    [] (42890) Provided verbal/tactile cueing for activities related to improving balance, coordination, kinesthetic sense, posture, motor skill, proprioception  to assist with  scapular, scapulothoracic and UE control with self care, reaching, carrying, lifting, house/yardwork, driving/computer work. Therapeutic Activities:    [] (81695 or 91260) Provided verbal/tactile cueing for activities related to improving balance, coordination, kinesthetic sense, posture, motor skill, proprioception and motor activation to allow for proper function of scapular, scapulothoracic and UE control with self care, carrying, lifting, driving/computer work.      Home Exercise Program:    [x] (44724) Reviewed/Progressed HEP activities related to strengthening, flexibility, endurance, ROM of scapular, scapulothoracic and UE control with self care, reaching, carrying, lifting, house/yardwork, driving/computer work  [] (12789) Reviewed/Progressed HEP activities related to improving balance, coordination, kinesthetic sense, posture, motor skill, proprioception of scapular, scapulothoracic and UE control with self care, reaching, carrying, lifting, house/yardwork, driving/computer work      Manual Treatments:  PROM / STM / Oscillations-Mobs:  G-I, II, III, IV (PA's, Inf., Post.)  [x] (53728) Provided manual therapy to mobilize soft tissue/joints of cervical/CT, scapular GHJ and UE for the purpose of modulating pain, promoting relaxation,  increasing ROM, reducing/eliminating soft tissue swelling/inflammation/restriction, improving soft tissue extensibility and allowing for proper ROM for normal function with self care, reaching, carrying, lifting, house/yardwork, driving/computer work    Modalities:  Ice 15 min    Charges:  Timed Code Treatment Minutes: 43'   Total Treatment Minutes: 3:00-4:00  60'       [] EVAL (LOW) 01900 (typically 20 minutes face-to-face)  [] EVAL (MOD) 77357 (typically 30 minutes face-to-face)  [] EVAL (HIGH) 48998 (typically 45 minutes face-to-face)  [] RE-EVAL     [x] JL(15857) x  2   [] IONTO  [x] NMR (08584) x  1   [] VASO  [] Manual (84638) x       [] Other:  [] TA x       [] Mech Traction (68999)  [] ES(attended) (08555)      [] ES (un) (40996):     GOALS:  Patient stated goal: RTW, adls without issues     Therapist goals for Patient:   Short Term Goals: To be achieved in: 2 weeks  1. Independent in HEP and progression per patient tolerance, in order to prevent re-injury. 2. Patient will have a decrease in pain to facilitate improvement in movement, function, and ADLs as indicated by Functional Deficits.     Long Term Goals: To be achieved in: 12 weeks  1. Disability index score of 15% or less for the UEFS to assist with reaching prior level of function. 2. Patient will demonstrate increased AROM to [facility] to allow for proper joint functioning as indicated by patients Functional Deficits. 3. Patient will demonstrate an increase in Strength to good scapular and core control  for UE to allow for proper functional mobility as indicated by patients Functional Deficits.    4. Patient will return to

## 2019-06-27 ENCOUNTER — HOSPITAL ENCOUNTER (OUTPATIENT)
Dept: PHYSICAL THERAPY | Age: 54
Setting detail: THERAPIES SERIES
Discharge: HOME | End: 2019-06-27
Payer: COMMERCIAL

## 2019-06-27 PROCEDURE — 97112 NEUROMUSCULAR REEDUCATION: CPT | Performed by: PHYSICAL THERAPIST

## 2019-06-27 PROCEDURE — 97110 THERAPEUTIC EXERCISES: CPT | Performed by: PHYSICAL THERAPIST

## 2019-06-27 NOTE — FLOWSHEET NOTE
The [address] [place] [address],[address], [address]  Phone: [phone]   Fax:     [phone]    Physical Therapy Daily Treatment Note  Date:  2019    Patient Name:  Jaclyn Chahal    :  1965  MRN: [id number]  Restrictions/Precautions:    Medical/Treatment Diagnosis Information:  Diagnosis: biceps tear, impingement m75.41  Treatment Diagnosis: m25.511   PROVIDER:     Zelda Ugarte MD  DATE OF PROCEDURE:  06/10/2019  PREOPERATIVE DIAGNOSES:  Right shoulder impingement, postsurgical  adhesions, biceps tendinopathy, possible rotator cuff tear.     POSTOPERATIVE DIAGNOSIS:  Right shoulder impingement, postsurgical  adhesions, biceps tendinopathy, no rotator cuff tear, no  glenohumeral arthritis, and bony excrescence of the lesser  tuberosity.     OPERATIONS PERFORMED:  Right shoulder examination under anesthesia,  diagnostic arthroscopy, arthroscopic extensive debridement of  rotator cuff and lesser tuberosity exostosis, arthroscopic removal  of retained sutures, arthroscopic lysis of adhesions, arthroscopic  revision subacromial decompression, open subpectoral biceps  tenodesis. 6/10/19      Insurance/Certification information:  PT Insurance Information: medical mutual   Physician Information:  Referring Practitioner: Zelda Ugarte  Plan of care signed (Y/N):     Date of Patient follow up with Physician:     Fiorella Patricio 100% disability        Progress Note: []  Yes  []  No  Next due by: Visit #10      Latex Allergy:  [x]NO      []YES  Preferred Language for Healthcare:   [x]English       []other:    Visit # Insurance Allowable   5 40 per year med mutual     Pain level:  0-4/10     SUBJECTIVE:   Reports that her shoulder is doing really well. States that pain has been minimal.  Anxious to progress her activities.     OBJECTIVE:              ROM PROM AROM  Comment     L R L R     Flexion   ~120          Abduction    appr 120 6/25   Biceps     Triceps          PRE's     Flexion     Abduction     External Rotation     Internal Rotation     Shrugs 2lbs 3x10 Increased 6/25   EXT     Reverse Flys     Serratus     Horizontal Abd with ER     Biceps     Triceps     Retraction          Cable Column/Theraband     External Rotation     Internal Rotation     Shrugs     Lats     Ext     Flex     Scapular Retraction     BIC     TRIC     PNF          Dynamic Stability          Plyoback          Manual interventions     PROM flex/abd/ER/IR 10' Added 6/25              Therapeutic Exercise and NMR EXR  [x] (19097) Provided verbal/tactile cueing for activities related to strengthening, flexibility, endurance, ROM  for improvements in scapular, scapulothoracic and UE control with self care, reaching, carrying, lifting, house/yardwork, driving/computer work.    [] (88961) Provided verbal/tactile cueing for activities related to improving balance, coordination, kinesthetic sense, posture, motor skill, proprioception  to assist with  scapular, scapulothoracic and UE control with self care, reaching, carrying, lifting, house/yardwork, driving/computer work. Therapeutic Activities:    [] (15313 or 40793) Provided verbal/tactile cueing for activities related to improving balance, coordination, kinesthetic sense, posture, motor skill, proprioception and motor activation to allow for proper function of scapular, scapulothoracic and UE control with self care, carrying, lifting, driving/computer work.      Home Exercise Program:    [x] (56912) Reviewed/Progressed HEP activities related to strengthening, flexibility, endurance, ROM of scapular, scapulothoracic and UE control with self care, reaching, carrying, lifting, house/yardwork, driving/computer work  [] (37642) Reviewed/Progressed HEP activities related to improving balance, coordination, kinesthetic sense, posture, motor skill, proprioception of scapular, scapulothoracic and UE control with self care, reaching, carrying, lifting, house/yardwork, driving/computer work      Manual Treatments:  PROM / STM / Oscillations-Mobs:  G-I, II, III, IV (PA's, Inf., Post.)  [x] (33238) Provided manual therapy to mobilize soft tissue/joints of cervical/CT, scapular GHJ and UE for the purpose of modulating pain, promoting relaxation,  increasing ROM, reducing/eliminating soft tissue swelling/inflammation/restriction, improving soft tissue extensibility and allowing for proper ROM for normal function with self care, reaching, carrying, lifting, house/yardwork, driving/computer work    Modalities:  Ice 15 min    Charges:  Timed Code Treatment Minutes: 44'   Total Treatment Minutes: 1:56-2:58  58'       [] EVAL (LOW) 44653 (typically 20 minutes face-to-face)  [] EVAL (MOD) 21269 (typically 30 minutes face-to-face)  [] EVAL (HIGH) 21763 (typically 45 minutes face-to-face)  [] RE-EVAL     [x] GS(10577) x  2   [] IONTO  [x] NMR (16772) x  1   [] VASO  [] Manual (16766) x       [] Other:  [] TA x       [] Mech Traction (50650)  [] ES(attended) (44464)      [] ES (un) (53317):     GOALS:  Patient stated goal: RTW, adls without issues     Therapist goals for Patient:   Short Term Goals: To be achieved in: 2 weeks  1. Independent in HEP and progression per patient tolerance, in order to prevent re-injury. 2. Patient will have a decrease in pain to facilitate improvement in movement, function, and ADLs as indicated by Functional Deficits.     Long Term Goals: To be achieved in: 12 weeks  1. Disability index score of 15% or less for the UEFS to assist with reaching prior level of function. 2. Patient will demonstrate increased AROM to [facility] to allow for proper joint functioning as indicated by patients Functional Deficits. 3. Patient will demonstrate an increase in Strength to good scapular and core control  for UE to allow for proper functional mobility as indicated by patients Functional Deficits.    4. Patient will return to functional activities without increased symptoms or restriction. light adls 6 weeks, heavy adls 12 weeks       Progression Towards Functional goals:  [x] Patient is progressing as expected towards functional goals listed. [] Progression is slowed due to complexities listed. [] Progression has been slowed due to co-morbidities. [] Plan just implemented, too soon to assess goals progression  [] Other:     ASSESSMENT:      Treatment/Activity Tolerance:  [x] Patient tolerated treatment well [] Patient limited by fatique  [] Patient limited by pain  [] Patient limited by other medical complications  [x] Other: 6/27 No complaints with today's program.  ROM is progressing well. Incision sites are healing well. Requires minimal cuing with current program.      Patient education: 6/13 Reviewed diagnosis, POC, HEP and its importance. Prognosis: [x] Good [] Fair  [] Poor    Patient Requires Follow-up: [x] Yes  [] No    PLAN: 1-2 days per week for 4 Weeks:6/13-7/13  [x] Continue per plan of care [] Alter current plan (see comments)  [] Plan of care initiated [] Hold pending MD visit [] Discharge    Progress per surgical precautions    Electronically signed by: Meg Reese PT      *If patient does not return for further follow ups after this date. Please consider this as the patients discharge from physical therapy.

## 2019-07-16 ENCOUNTER — HOSPITAL ENCOUNTER (OUTPATIENT)
Dept: PHYSICAL THERAPY | Age: 54
Setting detail: THERAPIES SERIES
Discharge: HOME | End: 2019-07-16
Payer: COMMERCIAL

## 2019-07-16 PROCEDURE — 97112 NEUROMUSCULAR REEDUCATION: CPT | Performed by: PHYSICAL THERAPIST

## 2019-07-16 PROCEDURE — 97110 THERAPEUTIC EXERCISES: CPT | Performed by: PHYSICAL THERAPIST

## 2019-07-16 NOTE — FLOWSHEET NOTE
functional activities without increased symptoms or restriction. light adls 6 weeks, heavy adls 12 weeks       Progression Towards Functional goals:  [x] Patient is progressing as expected towards functional goals listed. [] Progression is slowed due to complexities listed. [] Progression has been slowed due to co-morbidities. [] Plan just implemented, too soon to assess goals progression  [] Other:     ASSESSMENT:      Treatment/Activity Tolerance:  [x] Patient tolerated treatment well [] Patient limited by fatique  [] Patient limited by pain  [] Patient limited by other medical complications  [x] Other: 7/16 No complaints with today's program.  ROM is progressing well. Progressed PREs today without problem. Pt is progressing very well. Patient education: 6/13 Reviewed diagnosis, POC, HEP and its importance. Prognosis: [x] Good [] Fair  [] Poor    Patient Requires Follow-up: [x] Yes  [] No    PLAN: 1-2 days per week for 4 Weeks:6/13-7/13  [x] Continue per plan of care [] Alter current plan (see comments)  [] Plan of care initiated [] Hold pending MD visit [] Discharge    Progress per surgical precautions    Electronically signed by: Que Johnson PT      *If patient does not return for further follow ups after this date. Please consider this as the patients discharge from physical therapy.

## 2019-07-17 ENCOUNTER — OFFICE VISIT (OUTPATIENT)
Dept: ORTHOPEDIC SURGERY | Age: 54
End: 2019-07-17

## 2019-07-17 VITALS
WEIGHT: 179.9 LBS | DIASTOLIC BLOOD PRESSURE: 63 MMHG | SYSTOLIC BLOOD PRESSURE: 114 MMHG | BODY MASS INDEX: 26.64 KG/M2 | HEIGHT: 69 IN | HEART RATE: 63 BPM

## 2019-07-17 DIAGNOSIS — Z98.890 S/P ARTHROSCOPY OF RIGHT SHOULDER: Primary | ICD-10-CM

## 2019-07-17 PROCEDURE — 99024 POSTOP FOLLOW-UP VISIT: CPT | Performed by: ORTHOPAEDIC SURGERY

## 2019-07-18 ENCOUNTER — HOSPITAL ENCOUNTER (OUTPATIENT)
Dept: PHYSICAL THERAPY | Age: 54
Setting detail: THERAPIES SERIES
Discharge: HOME | End: 2019-07-18
Payer: COMMERCIAL

## 2019-07-18 PROCEDURE — 97110 THERAPEUTIC EXERCISES: CPT | Performed by: PHYSICAL THERAPIST

## 2019-07-18 PROCEDURE — 97112 NEUROMUSCULAR REEDUCATION: CPT | Performed by: PHYSICAL THERAPIST

## 2019-07-18 NOTE — FLOWSHEET NOTE
Abduction    appr 120 6/18         ER             IR             Other  (cervical)             Other                    Strength not tested due to recent surgery L R Comment   Flexion         Abduction         ER         IR         Supraspinatus         Upper Trap         Lower Trap         Mid Trap         Rhomboids         Biceps         Triceps         Horizontal Abduction         Horizontal Adduction         Lats                  Reflexes/Sensation:               [x]Dermatomes/Myotomes intact 6/13              []Reflexes equal and normal bilaterally               []Other:     Joint mobility: not assessed 6/13              []Normal               []Hypo              []Hyper     Palpation: general TTP due to recent surgery 6/13     Functional Mobility/Transfers: I with R UE posturing 6/13     Posture: WFL for 62 yr old female, ultra sling R shoulder  6/13     Bandages/Dressings/Incisions: removed in clinic, CDI , moderate effusion due to recent surgery 6/13     Gait: (include devices/WB status): WNL  R ultra sling in place 6/13        [x] Patient history, allergies, meds reviewed. Medical chart reviewed. See intake form. 6/13     Review Of Systems (ROS):  [x]Performed Review of systems (Integumentary, CardioPulmonary, Neurological) by intake and observation. Intake form has been scanned into medical record.  Patient has been instructed to contact their primary care physician regarding ROS issues if not already being addressed at this time.  6/13         RESTRICTIONS/PRECAUTIONS: no active biceps x 6 weeks    Exercises/Interventions:   Exercises:  Exercise/Equipment Resistance/Repetitions Other comments   Stretching/PROM     D/c 7/18   D/c 6/27   SAE [email or web address] 10x10\" Added 6/18   Pulleys flex/abd 10x10\" Increased 6/27   Towel IR  10x10\" Added 6/27        Isometrics           Weight shift     Flexion   D/c 7/16   External Rotation     Internal Rotation     D/c 7/16   Biceps     Triceps          PRE's     Flexion coordination, kinesthetic sense, posture, motor skill, proprioception of scapular, scapulothoracic and UE control with self care, reaching, carrying, lifting, house/yardwork, driving/computer work      Manual Treatments:  PROM / STM / Oscillations-Mobs:  G-I, II, III, IV (PA's, Inf., Post.)  [x] (91145) Provided manual therapy to mobilize soft tissue/joints of cervical/CT, scapular GHJ and UE for the purpose of modulating pain, promoting relaxation,  increasing ROM, reducing/eliminating soft tissue swelling/inflammation/restriction, improving soft tissue extensibility and allowing for proper ROM for normal function with self care, reaching, carrying, lifting, house/yardwork, driving/computer work    Modalities:  Ice 15 min    Charges:  Timed Code Treatment Minutes: 39'   Total Treatment Minutes: 9:55-11:02  67'       [] EVAL (LOW) 44845 (typically 20 minutes face-to-face)  [] EVAL (MOD) 77261 (typically 30 minutes face-to-face)  [] EVAL (HIGH) 12236 (typically 45 minutes face-to-face)  [] RE-EVAL     [x] YS(55322) x  2   [] IONTO  [x] NMR (67751) x  1   [] VASO  [] Manual (06666) x       [] Other:  [] TA x       [] Mech Traction (62640)  [] ES(attended) (26643)      [] ES (un) (28441):     GOALS:  Patient stated goal: RTW, adls without issues     Therapist goals for Patient:   Short Term Goals: To be achieved in: 2 weeks  1. Independent in HEP and progression per patient tolerance, in order to prevent re-injury. 2. Patient will have a decrease in pain to facilitate improvement in movement, function, and ADLs as indicated by Functional Deficits.     Long Term Goals: To be achieved in: 12 weeks  1. Disability index score of 15% or less for the UEFS to assist with reaching prior level of function. 2. Patient will demonstrate increased AROM to [facility] to allow for proper joint functioning as indicated by patients Functional Deficits.    3. Patient will demonstrate an increase in Strength to good scapular and core control for UE to allow for proper functional mobility as indicated by patients Functional Deficits. 4. Patient will return to functional activities without increased symptoms or restriction. light adls 6 weeks, heavy adls 12 weeks       Progression Towards Functional goals:  [x] Patient is progressing as expected towards functional goals listed. [] Progression is slowed due to complexities listed. [] Progression has been slowed due to co-morbidities. [] Plan just implemented, too soon to assess goals progression  [] Other:     ASSESSMENT:      Treatment/Activity Tolerance:  [x] Patient tolerated treatment well [] Patient limited by fatique  [] Patient limited by pain  [] Patient limited by other medical complications  [x] Other: 7/18 No complaints with today's program.  ROM is progressing well. Progressed PREs today without problem. Pt is progressing very well. Patient education: 6/13 Reviewed diagnosis, POC, HEP and its importance. Prognosis: [x] Good [] Fair  [] Poor    Patient Requires Follow-up: [x] Yes  [] No    PLAN: 1-2 days per week for 4 Weeks:6/13-7/13  [x] Continue per plan of care [] Alter current plan (see comments)  [] Plan of care initiated [] Hold pending MD visit [] Discharge    Progress per surgical precautions    Electronically signed by: Larisa Gain PT      *If patient does not return for further follow ups after this date. Please consider this as the patients discharge from physical therapy.

## 2019-07-23 ENCOUNTER — HOSPITAL ENCOUNTER (OUTPATIENT)
Dept: PHYSICAL THERAPY | Age: 54
Setting detail: THERAPIES SERIES
Discharge: HOME | End: 2019-07-23
Payer: COMMERCIAL

## 2019-07-23 PROCEDURE — 97112 NEUROMUSCULAR REEDUCATION: CPT | Performed by: PHYSICAL THERAPIST

## 2019-07-23 PROCEDURE — 97110 THERAPEUTIC EXERCISES: CPT | Performed by: PHYSICAL THERAPIST

## 2019-07-23 NOTE — FLOWSHEET NOTE
Other  (cervical)             Other                    Strength not tested due to recent surgery L R Comment   Flexion         Abduction         ER         IR         Supraspinatus         Upper Trap         Lower Trap         Mid Trap         Rhomboids         Biceps         Triceps         Horizontal Abduction         Horizontal Adduction         Lats                  Reflexes/Sensation:               [x]Dermatomes/Myotomes intact 6/13              []Reflexes equal and normal bilaterally               []Other:     Joint mobility: not assessed 6/13              []Normal               []Hypo              []Hyper     Palpation: general TTP due to recent surgery 6/13     Functional Mobility/Transfers: I with R UE posturing 6/13     Posture: WFL for 62 yr old female, ultra sling R shoulder  6/13     Bandages/Dressings/Incisions: removed in clinic, CDI , moderate effusion due to recent surgery 6/13     Gait: (include devices/WB status): WNL  R ultra sling in place 6/13        [x] Patient history, allergies, meds reviewed. Medical chart reviewed. See intake form. 6/13     Review Of Systems (ROS):  [x]Performed Review of systems (Integumentary, CardioPulmonary, Neurological) by intake and observation. Intake form has been scanned into medical record.  Patient has been instructed to contact their primary care physician regarding ROS issues if not already being addressed at this time.  6/13         RESTRICTIONS/PRECAUTIONS: no active biceps x 6 weeks    Exercises/Interventions:   Exercises:  Exercise/Equipment Resistance/Repetitions Other comments   Stretching/PROM     D/c 7/18   D/c 6/27   SAE [email or web address] 10x10\" Added 6/18   Pulleys flex/abd 10x10\" Increased 6/27   Towel IR  10x10\" Added 6/27        Isometrics           Weight shift     Flexion   D/c 7/16   External Rotation     Internal Rotation     D/c 7/16   Biceps     Triceps          PRE's     Flexion     Abduction     External Rotation     Internal Rotation Shrugs 4lbs 3x10 Increased 7/23   EXT 4lbs 3x10 Increased 7/23   Reverse Flys     Serratus 4lbs 3x10 Increased 7/23   Horizontal Abd with ER 3lbs 3x10 Increased 7/23   Biceps 3lbs 3x10 Increased 7/23   Triceps     Retraction          Cable Column/Theraband     External Rotation Blue 3x10 Increased 7/18   Internal Rotation Black 3x10 Increased 7/23   Shrugs     Lats     Ext     Flex     Scapular Retraction Black 3x10 Increased 7/23   BIC     TRIC Black 3x10 Added 7/18   PNF          Dynamic Stability          Plyoback          Manual interventions     PROM flex/abd/ER/IR 10' Added 6/25 held 7/18              Therapeutic Exercise and NMR EXR  [x] (92801) Provided verbal/tactile cueing for activities related to strengthening, flexibility, endurance, ROM  for improvements in scapular, scapulothoracic and UE control with self care, reaching, carrying, lifting, house/yardwork, driving/computer work.    [] (97690) Provided verbal/tactile cueing for activities related to improving balance, coordination, kinesthetic sense, posture, motor skill, proprioception  to assist with  scapular, scapulothoracic and UE control with self care, reaching, carrying, lifting, house/yardwork, driving/computer work. Therapeutic Activities:    [] (98958 or 95278) Provided verbal/tactile cueing for activities related to improving balance, coordination, kinesthetic sense, posture, motor skill, proprioception and motor activation to allow for proper function of scapular, scapulothoracic and UE control with self care, carrying, lifting, driving/computer work.      Home Exercise Program:    [x] (23293) Reviewed/Progressed HEP activities related to strengthening, flexibility, endurance, ROM of scapular, scapulothoracic and UE control with self care, reaching, carrying, lifting, house/yardwork, driving/computer work  [] (32192) Reviewed/Progressed HEP activities related to improving balance, coordination, kinesthetic sense, posture, motor skill, normal...

## 2019-07-25 ENCOUNTER — HOSPITAL ENCOUNTER (OUTPATIENT)
Dept: PHYSICAL THERAPY | Age: 54
Setting detail: THERAPIES SERIES
Discharge: HOME | End: 2019-07-25
Payer: COMMERCIAL

## 2019-07-25 PROCEDURE — 97110 THERAPEUTIC EXERCISES: CPT | Performed by: PHYSICAL THERAPIST

## 2019-07-25 PROCEDURE — 97140 MANUAL THERAPY 1/> REGIONS: CPT | Performed by: PHYSICAL THERAPIST

## 2019-07-29 ENCOUNTER — HOSPITAL ENCOUNTER (OUTPATIENT)
Dept: PHYSICAL THERAPY | Age: 54
Setting detail: THERAPIES SERIES
Discharge: HOME | End: 2019-07-29
Payer: COMMERCIAL

## 2019-07-29 PROCEDURE — 97140 MANUAL THERAPY 1/> REGIONS: CPT | Performed by: PHYSICAL THERAPIST

## 2019-07-29 PROCEDURE — 97110 THERAPEUTIC EXERCISES: CPT | Performed by: PHYSICAL THERAPIST

## 2019-07-29 NOTE — PLAN OF CARE
no  glenohumeral arthritis, and bony excrescence of the lesser  tuberosity.     OPERATIONS PERFORMED:  Right shoulder examination under anesthesia,  diagnostic arthroscopy, arthroscopic extensive debridement of  rotator cuff and lesser tuberosity exostosis, arthroscopic removal  of retained sutures, arthroscopic lysis of adhesions, arthroscopic  revision subacromial decompression, open subpectoral biceps  tenodesis. 6/10/19      Insurance/Certification information:  PT Insurance Information: medical mutual   Physician Information:  Referring Practitioner: Lavina Klinefelter  Plan of care signed (Y/N):     Date of Patient follow up with Physician:     Darlyn Downing 100% disability 6/13       Progress Note: []  Yes  []  No  Next due by: Visit #20      Latex Allergy:  [x]NO      []YES  Preferred Language for Healthcare:   [x]English       []other:    Visit # Insurance Allowable   10 40 per year med mutual     Pain level:  0-1/10     SUBJECTIVE:  7/29 Reports that her shoulder is doing well overall, however she has been experiencing some very mild soreness along the lateral shoulder.       OBJECTIVE: 7/29/19             ROM PROM AROM  Comment     L R L R     Flexion   180    165     Abduction   180    167     ER    85         IR    45         Other  (cervical)             Other                    Strength  L R Comment   Flexion         Abduction         ER         IR         Supraspinatus         Upper Trap         Lower Trap         Mid Trap         Rhomboids         Biceps         Triceps         Horizontal Abduction         Horizontal Adduction         Lats                  Reflexes/Sensation:               [x]Dermatomes/Myotomes intact               []Reflexes equal and normal bilaterally               []Other:     Joint mobility:               [x]Normal               []Hypo              []Hyper     Palpation:      Functional Mobility/Transfers: Independent     Posture: WNL     Bandages/Dressings/Incisions: fully healed     Gait: (include devices/WB status): normal without AD        [x] Patient history, allergies, meds reviewed. Medical chart reviewed. See intake form. 6/13     Review Of Systems (ROS):  [x]Performed Review of systems (Integumentary, CardioPulmonary, Neurological) by intake and observation. Intake form has been scanned into medical record. Patient has been instructed to contact their primary care physician regarding ROS issues if not already being addressed at this time.           RESTRICTIONS/PRECAUTIONS: no active biceps x 6 weeks    Exercises/Interventions:   Exercises:  Exercise/Equipment Resistance/Repetitions Other comments   Stretching/PROM     D/c 7/18   D/c 6/27   [email or web address] 10x10\" Added 6/18   Pulleys flex/abd 10x10\" Increased 6/27   Towel IR  10x10\" Added 6/27        Isometrics           Weight shift     Flexion   D/c 7/16   External Rotation     Internal Rotation     D/c 7/16   Biceps     Triceps          PRE's     Flexion     Abduction     External Rotation 3lbs 3x10 Added 7/25   Internal Rotation     Shrugs 5lbs 3x10 Increased 7/29   EXT 4lbs 3x10 Increased 7/23   Reverse Flys     Serratus 4lbs 3x10 Increased 7/23   Horizontal Abd with ER 3lbs 3x10 Increased 7/23   Biceps 4lbs 3x10 Increased 7/29   Triceps     Retraction          Cable Column/Theraband     External Rotation Black 3x10 Increased 7/25   Internal Rotation Silver 3x10 Increased 7/29   Shrugs     Lats     Ext     Flex     Scapular Retraction Silver 3x10 Increased 7/29   BIC     TRIC Silver 3x10 Increased 7/29   PNF          Dynamic Stability          Plyoback          Manual interventions     PROM flex/abd/ER/IR 10' Added 6/25 held 7/18              Therapeutic Exercise and NMR EXR  [x] (77495) Provided verbal/tactile cueing for activities related to strengthening, flexibility, endurance, ROM  for improvements in scapular, scapulothoracic and UE control with self care, reaching, carrying, lifting, house/yardwork, driving/computer work.     [] (47385) Provided verbal/tactile cueing for activities related to improving balance, coordination, kinesthetic sense, posture, motor skill, proprioception  to assist with  scapular, scapulothoracic and UE control with self care, reaching, carrying, lifting, house/yardwork, driving/computer work. Therapeutic Activities:    [] (62649 or 95153) Provided verbal/tactile cueing for activities related to improving balance, coordination, kinesthetic sense, posture, motor skill, proprioception and motor activation to allow for proper function of scapular, scapulothoracic and UE control with self care, carrying, lifting, driving/computer work.      Home Exercise Program:    [x] (71868) Reviewed/Progressed HEP activities related to strengthening, flexibility, endurance, ROM of scapular, scapulothoracic and UE control with self care, reaching, carrying, lifting, house/yardwork, driving/computer work  [] (65848) Reviewed/Progressed HEP activities related to improving balance, coordination, kinesthetic sense, posture, motor skill, proprioception of scapular, scapulothoracic and UE control with self care, reaching, carrying, lifting, house/yardwork, driving/computer work      Manual Treatments:  PROM / STM / Oscillations-Mobs:  G-I, II, III, IV (PA's, Inf., Post.)  [x] (31570) Provided manual therapy to mobilize soft tissue/joints of cervical/CT, scapular GHJ and UE for the purpose of modulating pain, promoting relaxation,  increasing ROM, reducing/eliminating soft tissue swelling/inflammation/restriction, improving soft tissue extensibility and allowing for proper ROM for normal function with self care, reaching, carrying, lifting, house/yardwork, driving/computer work    Modalities:  Ice 15 min    Charges:  Timed Code Treatment Minutes: 52'   Total Treatment Minutes: 10:00-11:15  75'       [] EVAL (LOW) 30094 (typically 20 minutes face-to-face)  [] EVAL (MOD) 59992 (typically 30 minutes face-to-face)  [] EVAL (HIGH) 90207 strength. Patient education: 6/13 Reviewed diagnosis, POC, HEP and its importance. Prognosis: [x] Good [] Fair  [] Poor    Patient Requires Follow-up: [x] Yes  [] No     PLAN: 2 days per week for 6 Weeks: 7/29-8/29  [x] Continue per plan of care [] Alter current plan (see comments)  [] Plan of care initiated [] Hold pending MD visit [] Discharge    Progress per pt tolerance    Electronically signed by: Una Alvarenga PT      *If patient does not return for further follow ups after this date. Please consider this as the patients discharge from physical therapy.

## 2019-08-01 ENCOUNTER — HOSPITAL ENCOUNTER (OUTPATIENT)
Dept: PHYSICAL THERAPY | Age: 54
Setting detail: THERAPIES SERIES
Discharge: HOME | End: 2019-08-01
Payer: COMMERCIAL

## 2019-08-01 PROCEDURE — 97140 MANUAL THERAPY 1/> REGIONS: CPT | Performed by: PHYSICAL THERAPIST

## 2019-08-01 PROCEDURE — 97110 THERAPEUTIC EXERCISES: CPT | Performed by: PHYSICAL THERAPIST

## 2019-08-01 NOTE — FLOWSHEET NOTE
The [address] [place] [address],[address], [address]  Phone: [phone]   Fax:     [phone]        Physical Therapy Daily Treatment Note  Date:  2019    Patient Name:  Annmarie Cornejo    :  1965  MRN: [id number]  Restrictions/Precautions:    Medical/Treatment Diagnosis Information:  Diagnosis: biceps tear, impingement m75.41  Treatment Diagnosis: m25.511   PROVIDER:     Lisa Cevallos MD  DATE OF PROCEDURE:  06/10/2019  PREOPERATIVE DIAGNOSES:  Right shoulder impingement, postsurgical  adhesions, biceps tendinopathy, possible rotator cuff tear.     POSTOPERATIVE DIAGNOSIS:  Right shoulder impingement, postsurgical  adhesions, biceps tendinopathy, no rotator cuff tear, no  glenohumeral arthritis, and bony excrescence of the lesser  tuberosity.     OPERATIONS PERFORMED:  Right shoulder examination under anesthesia,  diagnostic arthroscopy, arthroscopic extensive debridement of  rotator cuff and lesser tuberosity exostosis, arthroscopic removal  of retained sutures, arthroscopic lysis of adhesions, arthroscopic  revision subacromial decompression, open subpectoral biceps  tenodesis. 6/10/19      Insurance/Certification information:  PT Insurance Information: medical mutual   Physician Information:  Referring Practitioner: Lisa Cevallos  Plan of care signed (Y/N):     Date of Patient follow up with Physician:     Suyapa Brito 100% disability        Progress Note: []  Yes  []  No  Next due by: Visit #20      Latex Allergy:  [x]NO      []YES  Preferred Language for Healthcare:   [x]English       []other:    Visit # Insurance Allowable   11 40 per year med mutual     Pain level:  0-1/10     SUBJECTIVE:   Reports that her shoulder was really sore yesterday. States that she had to take Ibuprofen. Notes that it is feeling better today.     OBJECTIVE: 19             ROM PROM AROM  Comment     L R L R     Flexion   180    165

## 2019-08-06 ENCOUNTER — HOSPITAL ENCOUNTER (OUTPATIENT)
Dept: PHYSICAL THERAPY | Age: 54
Setting detail: THERAPIES SERIES
Discharge: HOME | End: 2019-08-06
Payer: COMMERCIAL

## 2019-08-06 PROCEDURE — 97110 THERAPEUTIC EXERCISES: CPT | Performed by: PHYSICAL THERAPIST

## 2019-08-06 PROCEDURE — 97140 MANUAL THERAPY 1/> REGIONS: CPT | Performed by: PHYSICAL THERAPIST

## 2019-08-06 NOTE — FLOWSHEET NOTE
Biceps 5lbs 3x10 Increased 8/6   Triceps     Retraction          Cable Column/Theraband     External Rotation Black 3x10 Increased 7/25   Internal Rotation Silver 3x10 Increased 7/29   Shrugs     Lats     Ext     Flex     Scapular Retraction Silver 3x10 Increased 7/29   BIC     TRIC Silver 3x10 Increased 7/29   PNF          Dynamic Stability          [employer]          Manual interventions     PROM flex/abd/ER/IR 10' Added 6/25 held 7/18              Therapeutic Exercise and NMR EXR  [x] (28758) Provided verbal/tactile cueing for activities related to strengthening, flexibility, endurance, ROM  for improvements in scapular, scapulothoracic and UE control with self care, reaching, carrying, lifting, house/yardwork, driving/computer work.    [] (17777) Provided verbal/tactile cueing for activities related to improving balance, coordination, kinesthetic sense, posture, motor skill, proprioception  to assist with  scapular, scapulothoracic and UE control with self care, reaching, carrying, lifting, house/yardwork, driving/computer work. Therapeutic Activities:    [] (52804 or 75797) Provided verbal/tactile cueing for activities related to improving balance, coordination, kinesthetic sense, posture, motor skill, proprioception and motor activation to allow for proper function of scapular, scapulothoracic and UE control with self care, carrying, lifting, driving/computer work.      Home Exercise Program:    [x] (13479) Reviewed/Progressed HEP activities related to strengthening, flexibility, endurance, ROM of scapular, scapulothoracic and UE control with self care, reaching, carrying, lifting, house/yardwork, driving/computer work  [] (24634) Reviewed/Progressed HEP activities related to improving balance, coordination, kinesthetic sense, posture, motor skill, proprioception of scapular, scapulothoracic and UE control with self care, reaching, carrying, lifting, house/yardwork, driving/computer work      Manual

## 2019-08-09 ENCOUNTER — HOSPITAL ENCOUNTER (OUTPATIENT)
Dept: PHYSICAL THERAPY | Age: 54
Setting detail: THERAPIES SERIES
Discharge: HOME | End: 2019-08-09
Payer: COMMERCIAL

## 2019-08-09 PROCEDURE — 97140 MANUAL THERAPY 1/> REGIONS: CPT | Performed by: PHYSICAL THERAPIST

## 2019-08-09 PROCEDURE — 97110 THERAPEUTIC EXERCISES: CPT | Performed by: PHYSICAL THERAPIST

## 2019-08-09 NOTE — FLOWSHEET NOTE
The [facility], [address] [place] [address]  Phone: [phone]   Fax:     [phone]        Physical Therapy Daily Treatment Note  Date:  2019    Patient Name:  Amilcar Gunter    :  1965  MRN: [id number]  Restrictions/Precautions:    Medical/Treatment Diagnosis Information:  Diagnosis: biceps tear, impingement m75.41  Treatment Diagnosis: m25.511   PROVIDER:     Marquez Clancy MD  DATE OF PROCEDURE:  06/10/2019  PREOPERATIVE DIAGNOSES:  Right shoulder impingement, postsurgical  adhesions, biceps tendinopathy, possible rotator cuff tear.     POSTOPERATIVE DIAGNOSIS:  Right shoulder impingement, postsurgical  adhesions, biceps tendinopathy, no rotator cuff tear, no  glenohumeral arthritis, and bony excrescence of the lesser  tuberosity.     OPERATIONS PERFORMED:  Right shoulder examination under anesthesia,  diagnostic arthroscopy, arthroscopic extensive debridement of  rotator cuff and lesser tuberosity exostosis, arthroscopic removal  of retained sutures, arthroscopic lysis of adhesions, arthroscopic  revision subacromial decompression, open subpectoral biceps  tenodesis. 6/10/19      Insurance/Certification information:  PT Insurance Information: medical mutual   Physician Information:  Referring Practitioner: Marquez Clancy  Plan of care signed (Y/N):     Date of Patient follow up with Physician:     Nini Glaser 100% disability        Progress Note: []  Yes  []  No  Next due by: Visit #20      Latex Allergy:  [x]NO      []YES  Preferred Language for Healthcare:   [x]English       []other:    Visit # Insurance Allowable   13 40 per year med mutual     Pain level:  0-1/10     SUBJECTIVE:   Reports that her shoulder is doing well. Reports no problems with ADLs.     OBJECTIVE: 19             ROM PROM AROM  Comment     L R L R     Flexion   180    165     Abduction   180    167     ER    85         IR    45

## 2019-08-13 ENCOUNTER — HOSPITAL ENCOUNTER (OUTPATIENT)
Dept: PHYSICAL THERAPY | Age: 54
Setting detail: THERAPIES SERIES
Discharge: HOME | End: 2019-08-13
Payer: COMMERCIAL

## 2019-08-13 PROCEDURE — 97110 THERAPEUTIC EXERCISES: CPT | Performed by: PHYSICAL THERAPIST

## 2019-08-13 PROCEDURE — 97140 MANUAL THERAPY 1/> REGIONS: CPT | Performed by: PHYSICAL THERAPIST

## 2019-08-13 NOTE — FLOWSHEET NOTE
The [facility], [address] [employer] [address] [place] [address]  Phone: [phone]   Fax:     [phone]        Physical Therapy Daily Treatment Note  Date:  2019    Patient Name:  Ag Quiroz    :  1965  MRN: [id number]  Restrictions/Precautions:    Medical/Treatment Diagnosis Information:  Diagnosis: biceps tear, impingement m75.41  Treatment Diagnosis: m25.511   PROVIDER:     Pelon Madera MD  DATE OF PROCEDURE:  06/10/2019  PREOPERATIVE DIAGNOSES:  Right shoulder impingement, postsurgical  adhesions, biceps tendinopathy, possible rotator cuff tear.     POSTOPERATIVE DIAGNOSIS:  Right shoulder impingement, postsurgical  adhesions, biceps tendinopathy, no rotator cuff tear, no  glenohumeral arthritis, and bony excrescence of the lesser  tuberosity.     OPERATIONS PERFORMED:  Right shoulder examination under anesthesia,  diagnostic arthroscopy, arthroscopic extensive debridement of  rotator cuff and lesser tuberosity exostosis, arthroscopic removal  of retained sutures, arthroscopic lysis of adhesions, arthroscopic  revision subacromial decompression, open subpectoral biceps  tenodesis. 6/10/19      Insurance/Certification information:  PT Insurance Information: medical mutual   Physician Information:  Referring Practitioner: Pelon Madera  Plan of care signed (Y/N):     Date of Patient follow up with Physician:     Ernesto Held 100% disability        Progress Note: []  Yes  []  No  Next due by: Visit #20      Latex Allergy:  [x]NO      []YES  Preferred Language for Healthcare:   [x]English       []other:    Visit # Insurance Allowable   13 40 per year med mutual     Pain level:  0-1/10     SUBJECTIVE:   Reports that her shoulder is doing well. Reports no problems with ADLs.     OBJECTIVE: 19             ROM PROM AROM  Comment     L R L R     Flexion   180    165     Abduction   180    167     ER    85         IR    45       restriction. light adls 6 weeks, heavy adls 12 weeks ONGOING      Progression Towards Functional goals:  [x] Patient is progressing as expected towards functional goals listed. [] Progression is slowed due to complexities listed. [] Progression has been slowed due to co-morbidities. [] Plan just implemented, too soon to assess goals progression  [] Other:     ASSESSMENT:      Treatment/Activity Tolerance:  [x] Patient tolerated treatment well [] Patient limited by fatique  [] Patient limited by pain  [] Patient limited by other medical complications  [x] Other: 8/13 No complaints with today's program.  ROM is progressing well. Strength is progressing well. Able to progress PREs today without problem. Patient education: 6/13 Reviewed diagnosis, POC, HEP and its importance. Prognosis: [x] Good [] Fair  [] Poor    Patient Requires Follow-up: [x] Yes  [] No     PLAN: 2 days per week for 6 Weeks: 7/29-8/29  [x] Continue per plan of care [] Alter current plan (see comments)  [] Plan of care initiated [] Hold pending MD visit [] Discharge    Progress per pt tolerance    Electronically signed by: Viviana Bright PT      *If patient does not return for further follow ups after this date. Please consider this as the patients discharge from physical therapy.

## 2019-08-14 ENCOUNTER — OFFICE VISIT (OUTPATIENT)
Dept: ORTHOPEDIC SURGERY | Age: 54
End: 2019-08-14

## 2019-08-14 VITALS
SYSTOLIC BLOOD PRESSURE: 99 MMHG | HEIGHT: 69 IN | BODY MASS INDEX: 26.64 KG/M2 | DIASTOLIC BLOOD PRESSURE: 66 MMHG | WEIGHT: 179.9 LBS | HEART RATE: 58 BPM

## 2019-08-14 DIAGNOSIS — Z98.890 S/P ARTHROSCOPY OF RIGHT SHOULDER: Primary | ICD-10-CM

## 2019-08-14 PROCEDURE — 99024 POSTOP FOLLOW-UP VISIT: CPT | Performed by: ORTHOPAEDIC SURGERY

## 2019-08-14 NOTE — PROGRESS NOTES
questionnaires were completed today. Radiology:     No new XR obtained at this time. Assessment :  Ms. Aura Joyce is a pleasant, 62 y.o. patient who is now 9 weeks out following a a right shoulder arthroscopy for open subpectoral biceps tenodesis with extensive debridement and lysis of adhesion on 6/10/19. Impression:  Encounter Diagnosis   Name Primary?  S/P arthroscopy of right shoulder Yes       Office Procedures:  Orders Placed This Encounter   Procedures    Ambulatory referral to Physical Therapy     Referral Priority:   Routine     Referral Type:   Eval and Treat     Referral Reason:   Specialty Services Required     Requested Specialty:   Physical Therapy     Number of Visits Requested:   1       Treatment Plan:  Aura Joyce is doing very well. We recommend She continue in physical therapy to transition from bands to free weights. We recommend she continue her home exercise program working on strength. A new physical therapy letter was documented in EPIC today. At this time we feel confident releasing Aura Joyce from our care. She will follow up with us if she experiences any setbacks. All questions were answered to patient's satisfaction and She was encouraged to call with any further questions or concerns. Aura Joyce is in agreement with this plan. 8/14/2019  9:49 AM      Antony Cage ATC  Orthopaedic Sports Medicine     During this examination, I, Antony Cage ATC, functioned as a scribe for Dr. Sotero Johnson. The history taking and physical examination were performed by Dr. Ilana Zaragoza. All counsleing during the appointment was performed between the patient and Dr. Ilana Zaragoza. 8/14/19   ______________  I, Dr. Sotero Johnson, personally performed the services described in this documentation as described by Antony Cage ATC in my presence, and it is both accurate and complete. Elbert Zaragoza MD, PhD  8/14/2019

## 2019-08-14 NOTE — LETTER
Shoulder Elbow Rehabilitation Referral    Patient Name: Ag Quiroz      YOB: 1965    Diagnosis:   1. S/P arthroscopy of right shoulder    Biceps tenodesis, lysis of adhesions with extensive debridement  DOS: 6/10/19    Precautions:     Post Op Instructions:  [] Continuous passive motion (CPM)  [] Elbow range of motion  [x] Exercise in plane of scapula   []  Strengthening     [] Pulley and instruction    [x] Home exercise program (copy to patient)   [] Sling when arm at risk  [] Sling or brace at all times   [x] AROM: Forward elevation to full            [x] AROM: External rotation to full    [] Isometric external rotator strengthening [x] AROM: internal rotation: up the back  [x] Isometric abductor strengthening  [x] AROM: Internal abduction     [] Isometric internal rotator strengthening [x] AROM: cross-body adduction             Stretching:     Strengthening: Gentle  [x] Four quadrant (FE, ER, IR, CBA)  [x] Rotator cuff (ER, IR, Abd)  [x] Forward Elevation    [x] External Rotators     [x] External Rotation    [x] Internal Rotators  [x] Internal Rotation: up/back   [x] Abductors     [x] Internal Rotation: supine in abduction  [x] Flexors  [x] Cross-body abduction    [x] Extensors  [x] Pendulum (FE, Abd/Add, cw/ccw)  [x] Scapular Stabilizers   [] Wall-walking (FE, Abd)    [x] Shoulder shrugs     [] Table slides      [x] Rhomboid pinch  [] Elbow (flex, ext, pron, sup)    [] Lat.  Pull downs     [] Medial epicondylitis program    [] Forward punch   [] Lateral epicondylitis program    [] Internal rotators     [x] Progressive resistive exercises  [] Bench Press        [] Bench press plus  Activities:     [] Lateral pull-downs  [x] Rowing     [] Progressive two-hand supine press  [] Stepper/Exercise bike   [x] Biceps: curls/supination  [] Swimming  [] Water exercises    Modalities: PRN    Return to Sport:  [] Ultrasound     [] Plyometrics  [] Iontophoresis     [] Rhythmic stabilization

## 2019-08-15 ENCOUNTER — HOSPITAL ENCOUNTER (OUTPATIENT)
Dept: PHYSICAL THERAPY | Age: 54
Setting detail: THERAPIES SERIES
Discharge: HOME | End: 2019-08-15
Payer: COMMERCIAL

## 2019-08-15 PROCEDURE — 97110 THERAPEUTIC EXERCISES: CPT | Performed by: PHYSICAL THERAPIST

## 2019-08-15 PROCEDURE — 97140 MANUAL THERAPY 1/> REGIONS: CPT | Performed by: PHYSICAL THERAPIST

## 2019-08-15 NOTE — FLOWSHEET NOTE
The [address] [place] [address],[address], [address]  Phone: [phone]   Fax:     [phone]        Physical Therapy Daily Treatment Note  Date:  8/15/2019    Patient Name:  Brian Jewell    :  1965  MRN: [id number]  Restrictions/Precautions:    Medical/Treatment Diagnosis Information:  Diagnosis: biceps tear, impingement m75.41  Treatment Diagnosis: m25.511   PROVIDER:     Columba Newman MD  DATE OF PROCEDURE:  06/10/2019  PREOPERATIVE DIAGNOSES:  Right shoulder impingement, postsurgical  adhesions, biceps tendinopathy, possible rotator cuff tear.     POSTOPERATIVE DIAGNOSIS:  Right shoulder impingement, postsurgical  adhesions, biceps tendinopathy, no rotator cuff tear, no  glenohumeral arthritis, and bony excrescence of the lesser  tuberosity.     OPERATIONS PERFORMED:  Right shoulder examination under anesthesia,  diagnostic arthroscopy, arthroscopic extensive debridement of  rotator cuff and lesser tuberosity exostosis, arthroscopic removal  of retained sutures, arthroscopic lysis of adhesions, arthroscopic  revision subacromial decompression, open subpectoral biceps  tenodesis. 6/10/19      Insurance/Certification information:  PT Insurance Information: medical mutual   Physician Information:  Referring Practitioner: Columba Newman  Plan of care signed (Y/N):     Date of Patient follow up with Physician:     Yoana Raza 100% disability        Progress Note: []  Yes  []  No  Next due by: Visit #20      Latex Allergy:  [x]NO      []YES  Preferred Language for Healthcare:   [x]English       []other:    Visit # Insurance Allowable   14 40 per year med mutual     Pain level:  0-1/10     SUBJECTIVE:  8/15 Reports that her shoulder is doing well. Reports no problems with ADLs. States that she saw referring physician yesterday and was pleased with progress.   Physician advised that she may decrease PT frequency to 1x/week for a few EXT 5lbs 3x10 Increased 8/13   Prone scaption 5lbs 3x10 Increased 8/13   Serratus 7lbs 3x10 Increased 8/13   Horizontal Abd with ER 5lbs 3x10 Increased 8/13   Biceps 7lbs 3x10 Increased 8/13   Triceps     Retraction          Cable Column/Theraband     External Rotation Silver 3x10 Increased 8/1   Internal Rotation Silver 3x10 Increased 7/29   Shrugs     Lats     Ext     Flex     Scapular Retraction Silver 3x10 Increased 7/29   BIC     TRIC Silver 3x10 Increased 7/29   PNF          Dynamic Stability          Plyoback          Manual interventions     PROM flex/abd/ER/IR 10' Added 6/25 held 7/18              Therapeutic Exercise and NMR EXR  [x] (01347) Provided verbal/tactile cueing for activities related to strengthening, flexibility, endurance, ROM  for improvements in scapular, scapulothoracic and UE control with self care, reaching, carrying, lifting, house/yardwork, driving/computer work.    [] (15686) Provided verbal/tactile cueing for activities related to improving balance, coordination, kinesthetic sense, posture, motor skill, proprioception  to assist with  scapular, scapulothoracic and UE control with self care, reaching, carrying, lifting, house/yardwork, driving/computer work. Therapeutic Activities:    [] (93790 or 84887) Provided verbal/tactile cueing for activities related to improving balance, coordination, kinesthetic sense, posture, motor skill, proprioception and motor activation to allow for proper function of scapular, scapulothoracic and UE control with self care, carrying, lifting, driving/computer work.      Home Exercise Program:    [x] (18983) Reviewed/Progressed HEP activities related to strengthening, flexibility, endurance, ROM of scapular, scapulothoracic and UE control with self care, reaching, carrying, lifting, house/yardwork, driving/computer work  [] (20813) Reviewed/Progressed HEP activities related to improving balance, coordination, kinesthetic sense, posture, motor skill, proper functional mobility as indicated by patients Functional Deficits. ONGOING  4. Patient will return to functional activities without increased symptoms or restriction. light adls 6 weeks, heavy adls 12 weeks ONGOING      Progression Towards Functional goals:  [x] Patient is progressing as expected towards functional goals listed. [] Progression is slowed due to complexities listed. [] Progression has been slowed due to co-morbidities. [] Plan just implemented, too soon to assess goals progression  [] Other:     ASSESSMENT:      Treatment/Activity Tolerance:  [x] Patient tolerated treatment well [] Patient limited by fatique  [] Patient limited by pain  [] Patient limited by other medical complications  [x] Other: 8/15 No complaints with today's program.  ROM is progressing well. Strength is progressing well. Able to progress PREs today without problem. Patient education: 6/13 Reviewed diagnosis, POC, HEP and its importance. Prognosis: [x] Good [] Fair  [] Poor    Patient Requires Follow-up: [x] Yes  [] No     PLAN: 2 days per week for 6 Weeks: 7/29-8/29  [x] Continue per plan of care [] Alter current plan (see comments)  [] Plan of care initiated [] Hold pending MD visit [] Discharge    Progress per pt tolerance    Electronically signed by: Rae Meyer PT      *If patient does not return for further follow ups after this date. Please consider this as the patients discharge from physical therapy.

## 2019-08-20 ENCOUNTER — APPOINTMENT (OUTPATIENT)
Dept: PHYSICAL THERAPY | Age: 54
End: 2019-08-20
Payer: COMMERCIAL

## 2019-08-22 ENCOUNTER — HOSPITAL ENCOUNTER (OUTPATIENT)
Dept: PHYSICAL THERAPY | Age: 54
Setting detail: THERAPIES SERIES
Discharge: HOME | End: 2019-08-22
Payer: COMMERCIAL

## 2019-08-22 PROCEDURE — 97110 THERAPEUTIC EXERCISES: CPT | Performed by: PHYSICAL THERAPIST

## 2019-08-27 ENCOUNTER — APPOINTMENT (OUTPATIENT)
Dept: PHYSICAL THERAPY | Age: 54
End: 2019-08-27
Payer: COMMERCIAL

## 2019-08-29 ENCOUNTER — HOSPITAL ENCOUNTER (OUTPATIENT)
Dept: PHYSICAL THERAPY | Age: 54
Setting detail: THERAPIES SERIES
Discharge: HOME | End: 2019-08-29
Payer: COMMERCIAL

## 2019-08-29 PROCEDURE — 97110 THERAPEUTIC EXERCISES: CPT | Performed by: PHYSICAL THERAPIST

## 2019-09-05 ENCOUNTER — HOSPITAL ENCOUNTER (OUTPATIENT)
Dept: PHYSICAL THERAPY | Age: 54
Setting detail: THERAPIES SERIES
Discharge: HOME | End: 2019-09-05
Payer: COMMERCIAL

## 2019-09-05 PROCEDURE — 97110 THERAPEUTIC EXERCISES: CPT | Performed by: PHYSICAL THERAPIST

## 2019-09-05 NOTE — DISCHARGE SUMMARY
The [facility] and [address] [place] [address], [address] [place] [address] [address] [place] [address], [address] [place] [address]  Phone: [phone]   Fax:     [phone]     Physical Therapy Discharge Summary    Dear  Dr. Marisela Ramirez,     We had the pleasure of treating the following patient for physical therapy services at [address] [place] [address]. A summary of our findings can be found in the discharge summary below. If you have any questions or concerns regarding these findings, please do not hesitate to contact me at the office phone number above. Thank you for the referral.     Physician Signature:________________________________Date:__________________  By signing above (or electronic signature), therapists plan is approved by physician      Overall Response to Treatment:   [x]Patient is responding well to treatment and improvement is noted with regards  to goals   []Patient should continue to improve in reasonable time if they continue HEP   []Patient has plateaued and is no longer responding to skilled PT intervention    []Patient is getting worse and would benefit from return to referring MD   []Patient unable to adhere to initial POC   [x]Other: DC HEP.  Follow up with PT PRN    Date range of Visits:  -   Total Visits: 17     Physical Therapy Daily Treatment Note  Date:  2019    Patient Name:  Lavinia Benoit    :  1965  MRN: [id number]  Restrictions/Precautions:    Medical/Treatment Diagnosis Information:  Diagnosis: biceps tear, impingement m75.41  Treatment Diagnosis: m25.511   PROVIDER:     Clary Phoenix, MD  DATE OF PROCEDURE:  06/10/2019  PREOPERATIVE DIAGNOSES:  Right shoulder impingement, postsurgical  adhesions, biceps tendinopathy, possible rotator cuff tear.     POSTOPERATIVE DIAGNOSIS:  Right shoulder impingement, postsurgical  adhesions, biceps tendinopathy, no rotator cuff tear, no  glenohumeral arthritis, and bony excrescence of the lesser  tuberosity.     OPERATIONS PERFORMED:  Right shoulder examination under anesthesia,  diagnostic arthroscopy, arthroscopic extensive debridement of  rotator cuff and lesser tuberosity exostosis, arthroscopic removal  of retained sutures, arthroscopic lysis of adhesions, arthroscopic  revision subacromial decompression, open subpectoral biceps  tenodesis. 6/10/19      Insurance/Certification information:  PT Insurance Information: medical mutual   Physician Information:  Referring Practitioner: America Navas  Plan of care signed (Y/N):     Date of Patient follow up with Physician:     Abhishek Rushing 0% disability 9/5       Progress Note: []  Yes  []  No  Next due by:    Latex Allergy:  [x]NO      []YES  Preferred Language for Healthcare:   [x]English       []other:    Visit # Insurance Allowable   17  9/5 40 per year med mutual     Pain level:  0/10 (9/5)     SUBJECTIVE:  (9/5) Reports that her shoulder is doing well. Reports that she has returned to most all ADLs at this point without problem.     OBJECTIVE: 9/5/19             ROM PROM AROM  Comment     L R L R     Flexion   180    165     Abduction   180    167     ER    85         IR    45         Other  (cervical)             Other                    Strength  L R Comment   Flexion         Abduction         ER  4+  4+     IR  4+  4+     Supraspinatus         Upper Trap         Lower Trap         Mid Trap         Rhomboids         Biceps         Triceps         Horizontal Abduction         Horizontal Adduction         Lats                  Reflexes/Sensation:               [x]Dermatomes/Myotomes intact               []Reflexes equal and normal bilaterally               []Other:     Joint mobility:               [x]Normal               []Hypo              []Hyper     Palpation:      Functional Mobility/Transfers: Independent     Posture: WNL     Bandages/Dressings/Incisions: fully healed     Gait: (include devices/WB status): normal without AD        [x] Patient

## 2019-09-12 ENCOUNTER — APPOINTMENT (OUTPATIENT)
Dept: PHYSICAL THERAPY | Age: 54
End: 2019-09-12
Payer: COMMERCIAL

## 2023-11-26 NOTE — PROGRESS NOTES
Prescriptions signed by physician at this time. Patient voided in restroom, patient denies any pain or nausea, provided with soda and fresh ice bags prior to discharge. Shoulder immobilizer in place. Discharged via wheelchair to spouse. - home regimen: coreg 3.125 BID, valsartan-HCTZ 80-12.5 qd, aldactone 50mg qd  - hold given orthostatic hypotensoin - controlled w/ diet  - A1c 6.3%  - FS/low ISS  - hypoglycemia protocol in place given poor PO intake

## (undated) DEVICE — SUTURE MCRYL SZ 4-0 L27IN ABSRB UD L19MM PS-2 1/2 CIR PRIM Y426H

## (undated) DEVICE — TOWEL,OR,DSP,ST,BLUE,DLX,8/PK,10PK/CS: Brand: MEDLINE

## (undated) DEVICE — SOLUTION IV 1000ML 0.9% SOD CHL

## (undated) DEVICE — 3M™ IOBAN™ 2 ANTIMICROBIAL INCISE DRAPE 6640EZ: Brand: IOBAN™ 2

## (undated) DEVICE — TUBING, SUCTION, 1/4" X 12', STRAIGHT: Brand: MEDLINE

## (undated) DEVICE — PUDDLEVAC FLOOR SUCTION DEVICE: Brand: PUDDLEVAC

## (undated) DEVICE — SUTURE FIBERWIRE SZ 2 W/ TAPERED NEEDLE BLUE L38IN NONABSORB BLU L26.5MM 1/2 CIRCLE AR7200

## (undated) DEVICE — TUBING FLD MGMT Y DBL SPIK DUALWAVE

## (undated) DEVICE — CANNULA ARTHSCP L7CM DIA7MM TRNSLUC THRD FLX W/ NO SQUIRT

## (undated) DEVICE — 3M™ STERI-DRAPE™ U-DRAPE 1015: Brand: STERI-DRAPE™

## (undated) DEVICE — SPONGE GZ W4XL8IN COT WVN 12 PLY

## (undated) DEVICE — SUTURE PDS II SZ 1 L27IN ABSRB VLT CT-1 L36MM 1/2 CIR Z341H

## (undated) DEVICE — TUBING PMP L16FT MAIN DISP FOR AR-6400 AR-6475

## (undated) DEVICE — PRE OP PACK: Brand: MEDLINE INDUSTRIES, INC.

## (undated) DEVICE — KIT SHLDR STBL MARCO FOR SPIDER LIMB POS

## (undated) DEVICE — GARMENT,MEDLINE,DVT,INT,CALF,MED, GEN2: Brand: MEDLINE

## (undated) DEVICE — INTENDED TO SUPPORT AND MAINTAIN THE POSITION OF AN ANESTHETIZED PATIENT DURING SURGERY: Brand: ERIN BEACH CHAIR FACE MASK

## (undated) DEVICE — PAD,ABDOMINAL,5"X9",ST,LF,25/BX: Brand: MEDLINE INDUSTRIES, INC.

## (undated) DEVICE — SUTURE VCRL SZ 3-0 L27IN ABSRB UD L26MM CT-2 1/2 CIR J232H

## (undated) DEVICE — GOWN,SIRUS,POLYRNF,BRTHSLV,XLN/XXL,18/CS: Brand: MEDLINE

## (undated) DEVICE — TUBING PMP L6FT CONT WAVE EXTN

## (undated) DEVICE — COVER LT HNDL BLU PLAS

## (undated) DEVICE — NEEDLE SPNL L3.5IN PNK HUB S STL REG WALL FIT STYL W/ QNCKE

## (undated) DEVICE — TURNOVER KIT RM INF CTRL TECH

## (undated) DEVICE — SYRINGE IRRIG 60ML SFT PLIABLE BLB EZ TO GRP 1 HND USE W/

## (undated) DEVICE — SURGICAL SET UP - SURE SET: Brand: MEDLINE INDUSTRIES, INC.

## (undated) DEVICE — 1010 S-DRAPE TOWEL DRAPE 10/BX: Brand: STERI-DRAPE™

## (undated) DEVICE — DRAPE 70X60IN SPLIT IMPERV ADHES STRIP

## (undated) DEVICE — CHLORAPREP 26ML ORANGE

## (undated) DEVICE — GAUZE,SPONGE,4"X4",16PLY,XRAY,STRL,LF: Brand: MEDLINE

## (undated) DEVICE — GLOVE SURG SZ 8 L12IN FNGR THK75MIL WHT LTX POLYMER BEAD

## (undated) DEVICE — PROBE ABLAT XL 90DEG ASPIR BPLR RF 1 PC ELECTRD ERGO HNDL

## (undated) DEVICE — 3M™ WARMING BLANKET, LOWER BODY, 10 PER CASE, 42568: Brand: BAIR HUGGER™

## (undated) DEVICE — STRAP SFTY W5XL72IN 1 PC

## (undated) DEVICE — PAD DRY FLOOR ABS 32X58IN GRN

## (undated) DEVICE — SYSTEM SKIN CLSR 22CM DERMBND PRINEO

## (undated) DEVICE — PAD,NON-ADHERENT,3X8,STERILE,LF,1/PK: Brand: MEDLINE

## (undated) DEVICE — BUR SHAVER 5 MMX13 CM 8 FLUT OVL FOR AGGRESSIVE BNE COOLCUT

## (undated) DEVICE — EYE PROTECTOR FOAM MEDICHOICE

## (undated) DEVICE — GOWN,REINFRCE,POLY,ECLIPSE,SLV,3XLG: Brand: MEDLINE

## (undated) DEVICE — SOLUTION IV IRRIG LACTATED RINGERS 3000ML 2B7487

## (undated) DEVICE — 3M™ COBAN™ NL STERILE NON-LATEX SELF-ADHERENT WRAP, 2084S, 4 IN X 5 YD (10 CM X 4,5 M), 18 ROLLS/CASE: Brand: 3M™ COBAN™

## (undated) DEVICE — BLADE SHV L13CM DIA5MM EXCALIBUR AGG COOLCUT

## (undated) DEVICE — GOWN,SIRUS,POLYRNF,BRTHSLV,XL,30/CS: Brand: MEDLINE

## (undated) DEVICE — STOCKINETTE ORTH W9XL36IN COT 2 PLY HLLW FOR HANDLING LMB

## (undated) DEVICE — DBD-PACK,SHOULDER III: Brand: MEDLINE

## (undated) DEVICE — KIT INSTR W/ 2.4MM GUIDEPIN SUT PASS WIRE NO2 FIBERWIRE

## (undated) DEVICE — PLATE ES AD W 9FT CRD 2

## (undated) DEVICE — 3M™ STERI-DRAPE™ U-DRAPE 1067 1067 5/BX 4BX/CS/CTN&#X20;: Brand: STERI-DRAPE™

## (undated) DEVICE — SURE SET-DOUBLE BASIN-LF: Brand: MEDLINE INDUSTRIES, INC.

## (undated) DEVICE — GLOVE SURG SZ 8 L12IN FNGR THK79MIL GRN LTX FREE

## (undated) DEVICE — INTENDED FOR TISSUE SEPARATION, AND OTHER PROCEDURES THAT REQUIRE A SHARP SURGICAL BLADE TO PUNCTURE OR CUT.: Brand: BARD-PARKER ® CARBON RIB-BACK BLADES

## (undated) DEVICE — SLING ARM L ABV 13IN DE-ROTATION STRP HOOKS PROVIDE IMMOB